# Patient Record
Sex: MALE | Race: WHITE
[De-identification: names, ages, dates, MRNs, and addresses within clinical notes are randomized per-mention and may not be internally consistent; named-entity substitution may affect disease eponyms.]

---

## 2021-03-11 ENCOUNTER — HOSPITAL ENCOUNTER (OUTPATIENT)
Dept: HOSPITAL 47 - RADUSWWP | Age: 71
Discharge: HOME | End: 2021-03-11
Attending: OPHTHALMOLOGY
Payer: MEDICARE

## 2021-03-11 DIAGNOSIS — G45.3: Primary | ICD-10-CM

## 2021-03-11 PROCEDURE — 93880 EXTRACRANIAL BILAT STUDY: CPT

## 2021-03-11 NOTE — US
EXAMINATION TYPE: US carotid duplex BILAT

 

DATE OF EXAM: 3/11/2021

 

COMPARISON: NONE

 

CLINICAL HISTORY: G45.3 AMAUNSIS FUGA. Pt states transient loss of vision with left eye

 

EXAM MEASUREMENTS: 

 

RIGHT:  Peak Systolic Velocity (PSV) cm/sec

----- Right CCA:  98.5  

----- Right ICA:  87.7     

----- Right ECA:  106   

ICA/CCA ratio:  0.9    

 

RIGHT:  End Diastole cm/sec

----- Right CCA:  16.3   

----- Right ICA:  24.5      

----- Right ECA:  11.6     

 

LEFT:  Peak Systolic Velocity (PSV) cm/sec

----- Left CCA:  85.8  

----- Left ICA:  98.4   

----- Left ECA:  109  

ICA/CCA ratio:  0.9  

 

LEFT:  End Diastole cm/sec

----- Left CCA:  17.4  

----- Left ICA:  24.6   

----- Left ECA:  15.0 

 

VERTEBRALS (direction of flow):

Right Vertebral: Antegrade

Left Vertebral: Antegrade

 

Rhythm:  Normal

 

No significant stenosis seen

 

 

 

IMPRESSION:  

1. No significant hemodynamic stenosis as visualized.   

 

 

Criteria for Assigning % of Stenosis / Diameter reduction

(Estimation based on the indirect measurements of the internal carotid artery velocities (ICA PSV).

1.  Normal (no stenosis)=ICA PSV < 125 cm/s: ratio < 2.0: ICA EDV<40 cm/s.

2. Less than 50% stenosis=ICA PSV < 125 cm/s: ratio < 2.0: ICA EDV<40 cm/s.

3.  50 to 69% stenosis=ICA PSV of 125 to 230 cm/s: ration 2.0 ? 4.0: ICA EDV  cm/s.

4.  Greater than 70% stenosis to near occlusion= ICA PSV > 230 cm/s: ratio > 4.0: ICA EDV > 100 cm/s.
 

5.  Near occlusion= ICA PSV velocities may be low or undetectable: variable ratio and ICA EDV.

6.  Total occlusion=unable to detect flow.

## 2021-04-05 ENCOUNTER — HOSPITAL ENCOUNTER (OUTPATIENT)
Dept: HOSPITAL 47 - RADCTMAIN | Age: 71
Discharge: HOME | End: 2021-04-05
Attending: FAMILY MEDICINE
Payer: MEDICARE

## 2021-04-05 DIAGNOSIS — I67.82: Primary | ICD-10-CM

## 2021-04-05 LAB — BUN SERPL-SCNC: 25 MG/DL (ref 9–20)

## 2021-04-05 PROCEDURE — 82565 ASSAY OF CREATININE: CPT

## 2021-04-05 PROCEDURE — 84520 ASSAY OF UREA NITROGEN: CPT

## 2021-04-05 PROCEDURE — 70470 CT HEAD/BRAIN W/O & W/DYE: CPT

## 2021-04-05 PROCEDURE — 36415 COLL VENOUS BLD VENIPUNCTURE: CPT

## 2021-04-05 NOTE — CT
EXAMINATION TYPE: CT brain wo/w con

 

DATE OF EXAM: 4/5/2021

 

COMPARISON: None

 

HISTORY: Syncope.

 

CT DLP: 2122.4 mGycm

 

Unenhanced CT of the brain was performed.  

 

The ventricles, basal cisterns and sulci overlying the cerebral convexities demonstrate mild enlargem
ent. 

 

There is no evidence for intracranial hemorrhage or sulcal effacement.  

 

There is decreased attenuation about the periventricular white matter and deep white matter of both c
erebral hemispheres, compatible with chronic small vessel ischemia. Differential diagnosis does inclu
de demyelination. 

 

No mass effects are seen.No midline shift.

 

Osseous calvarium is intact.  

 

If symptoms persist consider MRI.  

 

IMPRESSION:

 

1. Age related atrophic and chronic small vessel ischemic change without acute intracranial process s
een at this time.

## 2022-11-04 ENCOUNTER — HOSPITAL ENCOUNTER (OUTPATIENT)
Dept: HOSPITAL 47 - RADXRMAIN | Age: 72
Discharge: HOME | End: 2022-11-04
Attending: FAMILY MEDICINE
Payer: MEDICARE

## 2022-11-04 DIAGNOSIS — M17.12: Primary | ICD-10-CM

## 2022-11-04 NOTE — XR
EXAMINATION TYPE: XR knee complete bilateral

 

DATE OF EXAM: 11/4/2022 11:38 AM

 

INDICATION: 

Patient age:Male;  72 years old; 

Reason for study: R KNEE DERANGEMENT L KNEE AO/DJD; 

 

COMPARISON: None.

 

TECHNIQUE: The Bilateral knee(s) was examined in 3 projections. Frontal, lateral and oblique.

 

FINDINGS:   No evidence of any acute osseous pathology, soft tissue swelling, or joint effusion is no
bj.

Bilateral osteophyte formation and tibial plateau and patella is as well as the condyles. Right fabel
la noted. There is atherosclerosis of the arterial vasculature. There is joint space narrowing most p
ronounced in the medial joint spaces bilaterally.

 

IMPRESSION: 

1. No acute osseous pathology.

2. Moderate tricompartmental osteoarthritic changes.

## 2024-01-09 ENCOUNTER — HOSPITAL ENCOUNTER (OUTPATIENT)
Dept: HOSPITAL 47 - RADXRMAIN | Age: 74
Discharge: HOME | End: 2024-01-09
Attending: FAMILY MEDICINE
Payer: MEDICARE

## 2024-01-09 DIAGNOSIS — R06.02: Primary | ICD-10-CM

## 2024-01-09 PROCEDURE — 71046 X-RAY EXAM CHEST 2 VIEWS: CPT

## 2024-01-09 NOTE — XR
EXAMINATION TYPE: XR chest 2V

 

DATE OF EXAM: 1/9/2024

 

COMPARISON: 11/25/2014

 

INDICATION: Short of breath

 

TECHNIQUE:  Frontal and lateral views of the chest are obtained.

 

FINDINGS:  

The heart size is normal.  

The pulmonary vasculature is normal.

The lungs are clear.

 

IMPRESSION:  

1. No acute pulmonary process.

## 2024-04-25 ENCOUNTER — HOSPITAL ENCOUNTER (INPATIENT)
Dept: HOSPITAL 47 - EC | Age: 74
LOS: 4 days | Discharge: HOME | DRG: 280 | End: 2024-04-29
Attending: HOSPITALIST | Admitting: HOSPITALIST
Payer: MEDICARE

## 2024-04-25 DIAGNOSIS — Z79.899: ICD-10-CM

## 2024-04-25 DIAGNOSIS — I51.3: ICD-10-CM

## 2024-04-25 DIAGNOSIS — G47.9: ICD-10-CM

## 2024-04-25 DIAGNOSIS — I25.5: ICD-10-CM

## 2024-04-25 DIAGNOSIS — M19.90: ICD-10-CM

## 2024-04-25 DIAGNOSIS — I25.10: ICD-10-CM

## 2024-04-25 DIAGNOSIS — I11.0: Primary | ICD-10-CM

## 2024-04-25 DIAGNOSIS — J44.89: ICD-10-CM

## 2024-04-25 DIAGNOSIS — I25.2: ICD-10-CM

## 2024-04-25 DIAGNOSIS — I50.21: ICD-10-CM

## 2024-04-25 DIAGNOSIS — Z85.528: ICD-10-CM

## 2024-04-25 DIAGNOSIS — E78.5: ICD-10-CM

## 2024-04-25 DIAGNOSIS — I49.3: ICD-10-CM

## 2024-04-25 DIAGNOSIS — I21.4: ICD-10-CM

## 2024-04-25 DIAGNOSIS — Z87.891: ICD-10-CM

## 2024-04-25 DIAGNOSIS — I25.84: ICD-10-CM

## 2024-04-25 DIAGNOSIS — I34.0: ICD-10-CM

## 2024-04-25 LAB
ALBUMIN SERPL-MCNC: 4.2 G/DL (ref 3.5–5)
ALP SERPL-CCNC: 62 U/L (ref 38–126)
ALT SERPL-CCNC: 38 U/L (ref 4–49)
ANION GAP SERPL CALC-SCNC: 8 MMOL/L
AST SERPL-CCNC: 38 U/L (ref 17–59)
BASOPHILS # BLD AUTO: 0.1 K/UL (ref 0–0.2)
BASOPHILS NFR BLD AUTO: 1 %
BUN SERPL-SCNC: 20 MG/DL (ref 9–20)
CALCIUM SPEC-MCNC: 10 MG/DL (ref 8.4–10.2)
CHLORIDE SERPL-SCNC: 108 MMOL/L (ref 98–107)
CO2 SERPL-SCNC: 22 MMOL/L (ref 22–30)
EOSINOPHIL # BLD AUTO: 0.3 K/UL (ref 0–0.7)
EOSINOPHIL NFR BLD AUTO: 4 %
ERYTHROCYTE [DISTWIDTH] IN BLOOD BY AUTOMATED COUNT: 5.5 M/UL (ref 4.3–5.9)
ERYTHROCYTE [DISTWIDTH] IN BLOOD: 13.1 % (ref 11.5–15.5)
GLUCOSE SERPL-MCNC: 93 MG/DL (ref 74–99)
HCT VFR BLD AUTO: 51.9 % (ref 39–53)
HGB BLD-MCNC: 16.5 GM/DL (ref 13–17.5)
LYMPHOCYTES # SPEC AUTO: 1.7 K/UL (ref 1–4.8)
LYMPHOCYTES NFR SPEC AUTO: 21 %
MAGNESIUM SPEC-SCNC: 2.2 MG/DL (ref 1.6–2.3)
MCH RBC QN AUTO: 30 PG (ref 25–35)
MCHC RBC AUTO-ENTMCNC: 31.8 G/DL (ref 31–37)
MCV RBC AUTO: 94.4 FL (ref 80–100)
MONOCYTES # BLD AUTO: 0.5 K/UL (ref 0–1)
MONOCYTES NFR BLD AUTO: 6 %
NEUTROPHILS # BLD AUTO: 5.3 K/UL (ref 1.3–7.7)
NEUTROPHILS NFR BLD AUTO: 67 %
NT-PROBNP SERPL-MCNC: (no result) PG/ML
PLATELET # BLD AUTO: 178 K/UL (ref 150–450)
POTASSIUM SERPL-SCNC: 4.6 MMOL/L (ref 3.5–5.1)
PROT SERPL-MCNC: 6.8 G/DL (ref 6.3–8.2)
SODIUM SERPL-SCNC: 138 MMOL/L (ref 137–145)
WBC # BLD AUTO: 7.9 K/UL (ref 3.8–10.6)

## 2024-04-25 PROCEDURE — 96365 THER/PROPH/DIAG IV INF INIT: CPT

## 2024-04-25 PROCEDURE — 93005 ELECTROCARDIOGRAM TRACING: CPT

## 2024-04-25 PROCEDURE — 85025 COMPLETE CBC W/AUTO DIFF WBC: CPT

## 2024-04-25 PROCEDURE — 96366 THER/PROPH/DIAG IV INF ADDON: CPT

## 2024-04-25 PROCEDURE — 85027 COMPLETE CBC AUTOMATED: CPT

## 2024-04-25 PROCEDURE — 36415 COLL VENOUS BLD VENIPUNCTURE: CPT

## 2024-04-25 PROCEDURE — 96375 TX/PRO/DX INJ NEW DRUG ADDON: CPT

## 2024-04-25 PROCEDURE — 80053 COMPREHEN METABOLIC PANEL: CPT

## 2024-04-25 PROCEDURE — 93458 L HRT ARTERY/VENTRICLE ANGIO: CPT

## 2024-04-25 PROCEDURE — 87636 SARSCOV2 & INF A&B AMP PRB: CPT

## 2024-04-25 PROCEDURE — 71250 CT THORAX DX C-: CPT

## 2024-04-25 PROCEDURE — 83735 ASSAY OF MAGNESIUM: CPT

## 2024-04-25 PROCEDURE — 83880 ASSAY OF NATRIURETIC PEPTIDE: CPT

## 2024-04-25 PROCEDURE — 93306 TTE W/DOPPLER COMPLETE: CPT

## 2024-04-25 PROCEDURE — 84484 ASSAY OF TROPONIN QUANT: CPT

## 2024-04-25 PROCEDURE — 71046 X-RAY EXAM CHEST 2 VIEWS: CPT

## 2024-04-25 PROCEDURE — 80048 BASIC METABOLIC PNL TOTAL CA: CPT

## 2024-04-25 PROCEDURE — 85610 PROTHROMBIN TIME: CPT

## 2024-04-25 PROCEDURE — 99291 CRITICAL CARE FIRST HOUR: CPT

## 2024-04-25 PROCEDURE — 87070 CULTURE OTHR SPECIMN AEROBIC: CPT

## 2024-04-25 PROCEDURE — 85730 THROMBOPLASTIN TIME PARTIAL: CPT

## 2024-04-25 RX ADMIN — FUROSEMIDE STA MG: 10 INJECTION, SOLUTION INTRAMUSCULAR; INTRAVENOUS at 20:25

## 2024-04-25 RX ADMIN — HEPARIN SODIUM SCH MLS/HR: 10000 INJECTION, SOLUTION INTRAVENOUS at 20:30

## 2024-04-25 RX ADMIN — HEPARIN SODIUM ONE UNIT: 1000 INJECTION, SOLUTION INTRAVENOUS; SUBCUTANEOUS at 20:28

## 2024-04-25 RX ADMIN — ASPIRIN 81 MG CHEWABLE TABLET STA MG: 81 TABLET CHEWABLE at 20:24

## 2024-04-25 NOTE — XR
EXAMINATION TYPE: XR chest 2V

 

DATE OF EXAM: 4/25/2024

 

COMPARISON: 1/9/2024

 

HISTORY: Shortness of breath

 

TECHNIQUE:  Frontal and lateral views of the chest are obtained.

 

FINDINGS:  

 

There is moderate cardiomegaly and small bilateral pleural effusions. The pulmonary vasculature is no
t congested. There is no interstitial or airspace opacities suggest pulmonary edema.

 

There are multiple healed left rib fractures otherwise the osseous structures are intact

 

IMPRESSION:  Moderate cardiomegaly with small bilateral pleural effusions suggesting mild CHF.

## 2024-04-25 NOTE — ED
SOB HPI





- General


Source: patient, RN notes reviewed


Mode of arrival: ambulatory


Limitations: no limitations





<Jaqueline Chairez - Last Filed: 04/25/24 17:16>





- General


Source: patient, RN notes reviewed, old records reviewed





<Willy Nina - Last Filed: 04/25/24 20:49>





- General


Chief Complaint: Shortness of Breath


Stated Complaint: HAYDE/SOB


Time Seen by Provider: 04/25/24 16:50





- History of Present Illness


Initial Comments: 


Gus notewinstons is a 74-year-old male presents emergency department chief 

complaint of dyspnea.  Patient is also endorsing symptoms of orthopnea over the 

past few weeks to month.  Patient states that he has been experiencing dyspnea 

since the fall due to hydrogen sulfide poisoning at a landfill.  Patient has 

history of COPD states he has history of asthma as a child.  Denies fevers, 

history of DVT or PE, lower extremity swelling, and is not on any blood 

thinners. 


 (Jaqueline Chairez)


Patient is a 74-year-old male who presents emergency department complaining of 

dyspnea.  States for the last 2 weeks he has been having worsening shortness of 

breath, orthopnea, difficulty sleeping at night.  Also endorsing worsening lower

extremity edema.  No cough.  No chest pain.  No abdominal pain, nausea, vomi

ting.  Presents emergency department for further evaluation at this time.  

Denies any follow-up with cardiology.  Has a history of hypertension.  Presents 

for further evaluation at this time.  Denies any chest pain. (Willy Nina)





- Related Data


                                Home Medications











 Medication  Instructions  Recorded  Confirmed


 


Cholecalciferol [Vitamin D3 (25 25 mcg PO DAILY 04/25/24 04/25/24





Mcg = 1000 Iu)]   


 


Simvastatin [Zocor] 20 mg PO DAILY 04/25/24 04/25/24


 


Vit C/E/Zn/Coppr/Lutein/Zeaxan 1 cap PO DAILY 04/25/24 04/25/24





[Preservision Areds 2 Softgel]   


 


amLODIPine [Norvasc] 10 mg PO DAILY 04/25/24 04/25/24


 


lisinopriL [Zestril] 20 mg PO DAILY 04/25/24 04/25/24











                                    Allergies











Allergy/AdvReac Type Severity Reaction Status Date / Time


 


No Known Allergies Allergy   Verified 04/25/24 19:53














Review of Systems


ROS Other: All systems not noted in ROS Statement are negative.





<Jaqueline Chairez - Last Filed: 04/25/24 17:16>


ROS Other: All systems not noted in ROS Statement are negative.





<Willy Nina - Last Filed: 04/25/24 20:49>


ROS Statement: 


Those systems with pertinent positive or pertinent negative responses have been 

documented in the HPI.





Review of Systems:


CONST: Denies fever


EYES: Denies blurry vision


ENT: Denies nasal congestion


C/V: Denies Chest pain


RESP: Endorses shortness of breath


GI: Denies abdominal pain


: Denies dysuria


SKIN: Denies rash.


MSK: Denies joint pain.


NEURO: Denies headache


 (Willy Nina)





Past Medical History


Past Medical History: Hyperlipidemia, Hypertension


History of Any Multi-Drug Resistant Organisms: None Reported


Additional Past Surgical History / Comment(s): rt kidney removal


Past Psychological History: No Psychological Hx Reported


Smoking Status: Never smoker


Past Alcohol Use History: Occasional


Past Drug Use History: None Reported





<Jaqueline Chairez - Last Filed: 04/25/24 17:16>





General Exam


Limitations: no limitations





<Jaqueline Chairez - Last Filed: 04/25/24 17:16>





<Willy Nina - Last Filed: 04/25/24 20:49>





- General Exam Comments


Initial Comments: 


Visual Physical Exam


 


Vital signs reviewed


 


General: Well-appearing, nontoxic, no acute distress.


Head: Normocephalic, atraumatic


Eyes: PERRLA, EOMI


ENT: Airway patent


Chest: Nonlabored breathing


Skin: No visual rash, normal skin tone


Neuro: Alert and oriented 3


Musculoskeletal: No gross abnormalities





 (Jaqueline Chairez)


General: Appears in no acute distress.


HEAD: Normal with no signs of head trauma.


EYES: PERRLA, EOMI, conjunctiva normal, no discharge.


ENT: Hearing grossly intact, normal oropharynx.


RESPIRATORY: Clear breath sounds bilaterally.  No wheezes, rales, or rhonchi.  

No hypoxia.


C/V: Regular rate and rhythm. S1 and S2 auscultated, metrical lower extremity 

pitting edema, peripheral pulses 2+ and intact throughout


ABD: Abd is soft, nontender, nondistended


EXT: Normal range of motion, no obvious deformity


SKIN: No rashes or lesions observed on exposed skin.


NEURO: Alert and oriented x 4.


 (Willy Nina)





Course


                                   Vital Signs











  04/25/24 04/25/24 04/25/24





  16:42 18:34 19:56


 


Temperature 97.9 F  


 


Pulse Rate 75 87 80


 


Respiratory 16 18 18





Rate   


 


Blood Pressure 179/117 181/120 169/120


 


O2 Sat by Pulse 99 98 97





Oximetry   














Medical Decision Making





<Jaqueline Chairez - Last Filed: 04/25/24 17:16>





- Lab Data


Result diagrams: 


                                 04/25/24 18:14





                                 04/25/24 19:20





- EKG Data


-: EKG Interpreted by Me





<MaicoWilly - Last Filed: 04/25/24 20:49>





- Medical Decision Making


I completed the quick note portion of this chart signed Jaqueline Chairez PA-C


 (Jaqueline Chairez)


Was pt. sent in by a medical professional or institution ([facility], NP, urgent 

care, hospital, or nursing home...) When possible be specific


@  -No


Did you speak to anyone other than the patient for history (EMS, parent, family,

 police, friend...)? What history was obtained from this source 


@  -No


Did you review nursing and triage notes (agree or disagree)?  Why? 


@  -I reviewed and agree with nursing and triage notes


Were old charts reviewed (outside hosp., previous admission, EMS record, old 

EKG, old radiological studies, urgent care reports/EKG's, nursing home records)?

 Report findings 


@  -Old charts reviewed


Differential Diagnosis (chest pain, altered mental status, abdominal pain women,

 abdominal pain men, vaginal bleeding, weakness, fever, dyspnea, syncope, 

headache, dizziness, GI bleed, back pain, seizure, CVA, palpatations, mental 

health, musculoskeletal)? 


@  -Differential Dyspnea:


Coronary syndrome, arrhythmia, tamponade, asthma, COPD, pulmonary embolism, 

pneumonia, pneumothorax, pulmonary effusion, anaphylaxis, diabetic ketoacidosis,

 flailed chest, pulmonary contusion, diaphragmatic rupture, anemia, 

neuromuscular, this is not meant to be an all-inclusive list. 





EKG interpreted by me (3pts min.).


@  -As above


X-rays interpreted by me (1pt min.).


@  -Chest x-ray shows findings consistent with CHF


CT interpreted by me (1pt min.).


@  -None done


U/S interpreted by me (1pt. min.).


@  -None done


What testing was considered but not performed or refused? (CT, X-rays, U/S, 

labs)? Why?


@  -None


What meds were considered but not given or refused? Why?


@  -None


Did you discuss the management of the patient with other professionals 

(professionals i.e. , PA, NP, lab, RT, psych nurse, , , 

teacher, , )? Give summary


@  -Discussed with admitting provider, SHAHNAZ Latif of [facility] who accepted the 

admission.


Was smoking cessation discussed for >3mins.?


@  -No


Was critical care preformed (if so, how long)?


@  -Yes, 35 minutes


Were there social determinants of health that impacted care today? How? 

(Homelessness, low income, unemployed, alcoholism, drug addiction, transporta

tion, low edu. Level, literacy, decrease access to med. care, skilled nursing, rehab)?


@  -No


Was there de-escalation of care discussed even if they declined (Discuss DNR or 

withdrawal of care, Hospice)? DNR status


@  -No


What co-morbidities impacted this encounter? (DM, HTN, Smoking, COPD, CAD, 

Cancer, CVA, ARF, Chemo, Hep., AIDS, mental health diagnosis, sleep apnea, 

morbid obesity)?


@  -Hypertension


Was patient admitted / discharged? Hospital course, mention meds given and 

route, prescriptions, significant lab abnormalities, going to OR and other 

pertinent info.


@  -Based on the patient's presentation and physical exam, presents with 

dyspnea.  Suspect CHF exacerbation for the patient.  Patient originally worked 

up as a quick note.  We will obtain cardiopulmonary workup.  Patient in 

agreement this plan.  Vital signs currently within acceptable limits.





EKG shows no signs of acute ischemia.  Laboratory studies remarkable for 

elevated troponin of 0.823 as well as elevated BNP of 13,000.  Chest x-ray does 

show findings consistent with CHF.





I updated the patient.  He is chest pain-free.  We discussed his workup.  

Patient be started on a heparin drip for NSTEMI as well as IV Lasix for CHF.  

Patient was in agreement this plan.  Echo ordered.  Cardiology consulted.





I spoke with the admitting team, SHAHNAZ Latif of [facility] who accepted the admission.








Undiagnosed new problem with uncertain prognosis?


@  -No


Drug Therapy requiring intensive monitoring for toxicity (Heparin, Nitro, 

Insulin, Cardizem)?


@  -Heparin


Were any procedures done?


@  -No


Diagnosis/symptom?


@  -NSTEMI, CHF


Acute, or Chronic, or Acute on Chronic?


@  -Acute


Uncomplicated (without systemic symptoms) or Complicated (systemic symptoms)?


@  -Complicated


Side effects of treatment?


@  -No


Exacerbation, Progression, or Severe Exacerbation?


@  -No


Poses a threat to life or bodily function? How? (Chest pain, USA, MI, pneumonia,

 PE, COPD, DKA, ARF, appy, cholecystitis, CVA, Diverticulitis, Homicidal, 

Suicidal, threat to staff... and all critical care pts)


@  -Yes


 (Willy Nina)





- Lab Data


                                   Lab Results











  04/25/24 04/25/24 04/25/24 Range/Units





  18:14 18:57 19:20 


 


WBC  7.9    (3.8-10.6)  k/uL


 


RBC  5.50    (4.30-5.90)  m/uL


 


Hgb  16.5    (13.0-17.5)  gm/dL


 


Hct  51.9    (39.0-53.0)  %


 


MCV  94.4    (80.0-100.0)  fL


 


MCH  30.0    (25.0-35.0)  pg


 


MCHC  31.8    (31.0-37.0)  g/dL


 


RDW  13.1    (11.5-15.5)  %


 


Plt Count  178    (150-450)  k/uL


 


MPV  8.0    


 


Neutrophils %  67    %


 


Lymphocytes %  21    %


 


Monocytes %  6    %


 


Eosinophils %  4    %


 


Basophils %  1    %


 


Neutrophils #  5.3    (1.3-7.7)  k/uL


 


Lymphocytes #  1.7    (1.0-4.8)  k/uL


 


Monocytes #  0.5    (0-1.0)  k/uL


 


Eosinophils #  0.3    (0-0.7)  k/uL


 


Basophils #  0.1    (0-0.2)  k/uL


 


Sodium    138  (137-145)  mmol/L


 


Potassium    4.6  (3.5-5.1)  mmol/L


 


Chloride    108 H  ()  mmol/L


 


Carbon Dioxide    22  (22-30)  mmol/L


 


Anion Gap    8  mmol/L


 


BUN    20  (9-20)  mg/dL


 


Creatinine    1.12  (0.66-1.25)  mg/dL


 


Est GFR (CKD-EPI)AfAm    76  (>60 ml/min/1.73 sqM)  


 


Est GFR (CKD-EPI)NonAf    66  (>60 ml/min/1.73 sqM)  


 


Glucose    93  (74-99)  mg/dL


 


Calcium    10.0  (8.4-10.2)  mg/dL


 


Magnesium    2.2  (1.6-2.3)  mg/dL


 


Total Bilirubin    2.0 H  (0.2-1.3)  mg/dL


 


AST    38  (17-59)  U/L


 


ALT    38  (4-49)  U/L


 


Alkaline Phosphatase    62  ()  U/L


 


Troponin I     (0.000-0.034)  ng/mL


 


NT-Pro-B Natriuret Pep    65962  pg/mL


 


Total Protein    6.8  (6.3-8.2)  g/dL


 


Albumin    4.2  (3.5-5.0)  g/dL


 


Influenza Type A (PCR)   Not Detected   (Not Detectd)  


 


Influenza Type B (PCR)   Not Detected   (Not Detectd)  


 


RSV (PCR)   Not Detected   (Not Detectd)  


 


SARS-CoV-2 (PCR)   Not Detected   (Not Detectd)  














  04/25/24 Range/Units





  19:20 


 


WBC   (3.8-10.6)  k/uL


 


RBC   (4.30-5.90)  m/uL


 


Hgb   (13.0-17.5)  gm/dL


 


Hct   (39.0-53.0)  %


 


MCV   (80.0-100.0)  fL


 


MCH   (25.0-35.0)  pg


 


MCHC   (31.0-37.0)  g/dL


 


RDW   (11.5-15.5)  %


 


Plt Count   (150-450)  k/uL


 


MPV   


 


Neutrophils %   %


 


Lymphocytes %   %


 


Monocytes %   %


 


Eosinophils %   %


 


Basophils %   %


 


Neutrophils #   (1.3-7.7)  k/uL


 


Lymphocytes #   (1.0-4.8)  k/uL


 


Monocytes #   (0-1.0)  k/uL


 


Eosinophils #   (0-0.7)  k/uL


 


Basophils #   (0-0.2)  k/uL


 


Sodium   (137-145)  mmol/L


 


Potassium   (3.5-5.1)  mmol/L


 


Chloride   ()  mmol/L


 


Carbon Dioxide   (22-30)  mmol/L


 


Anion Gap   mmol/L


 


BUN   (9-20)  mg/dL


 


Creatinine   (0.66-1.25)  mg/dL


 


Est GFR (CKD-EPI)AfAm   (>60 ml/min/1.73 sqM)  


 


Est GFR (CKD-EPI)NonAf   (>60 ml/min/1.73 sqM)  


 


Glucose   (74-99)  mg/dL


 


Calcium   (8.4-10.2)  mg/dL


 


Magnesium   (1.6-2.3)  mg/dL


 


Total Bilirubin   (0.2-1.3)  mg/dL


 


AST   (17-59)  U/L


 


ALT   (4-49)  U/L


 


Alkaline Phosphatase   ()  U/L


 


Troponin I  0.823 H*  (0.000-0.034)  ng/mL


 


NT-Pro-B Natriuret Pep   pg/mL


 


Total Protein   (6.3-8.2)  g/dL


 


Albumin   (3.5-5.0)  g/dL


 


Influenza Type A (PCR)   (Not Detectd)  


 


Influenza Type B (PCR)   (Not Detectd)  


 


RSV (PCR)   (Not Detectd)  


 


SARS-CoV-2 (PCR)   (Not Detectd)  














- EKG Data


EKG Comments: 





12-lead Electrocardiogram Interpretation Note





EKG was reviewed and interpreted by myself. 12-lead ECG performed at 1819 is 

interpreted by me as revealing normal sinus rhythm at a rate of 86 beats per 

minute.  Axis is normal.  MS interval is 147 ms, QRS duration is 88 ms, QTc is 

419 ms..  There were no ST or T wave abnormalities to suggest myocardial 

ischemia or injury.  R wave progression across the precordium was satisfactory. 

 By my interpretation this EKG is non-diagnostic for acute ischemia.


 (Willy Nina)





Critical Care Time


Critical Care Time: Yes


Total Critical Care Time: 35





<Willy Nina - Last Filed: 04/25/24 20:49>





Disposition





<Jaqueline Chairez - Last Filed: 04/25/24 17:16>


Time of Disposition: 20:28





<Willy Nina - Last Filed: 04/25/24 20:49>


Clinical Impression: 


 Congestive heart failure, NSTEMI (non-ST elevated myocardial infarction)





Disposition: ADMITTED AS IP TO THIS HOSP


Condition: Serious


Referrals: 


Oswaldo Saldaña DO [Primary Care Provider] - 1-2 days

## 2024-04-26 LAB
ALBUMIN SERPL-MCNC: 3.7 G/DL (ref 3.5–5)
ALP SERPL-CCNC: 57 U/L (ref 38–126)
ALT SERPL-CCNC: 34 U/L (ref 4–49)
ANION GAP SERPL CALC-SCNC: 5 MMOL/L
APTT BLD: 33.9 SEC (ref 22–30)
AST SERPL-CCNC: 33 U/L (ref 17–59)
BASOPHILS # BLD AUTO: 0.1 K/UL (ref 0–0.2)
BASOPHILS NFR BLD AUTO: 1 %
BUN SERPL-SCNC: 23 MG/DL (ref 9–20)
CALCIUM SPEC-MCNC: 9.5 MG/DL (ref 8.4–10.2)
CHLORIDE SERPL-SCNC: 107 MMOL/L (ref 98–107)
CO2 SERPL-SCNC: 26 MMOL/L (ref 22–30)
EOSINOPHIL # BLD AUTO: 0.3 K/UL (ref 0–0.7)
EOSINOPHIL NFR BLD AUTO: 5 %
ERYTHROCYTE [DISTWIDTH] IN BLOOD BY AUTOMATED COUNT: 4.98 M/UL (ref 4.3–5.9)
ERYTHROCYTE [DISTWIDTH] IN BLOOD: 13.1 % (ref 11.5–15.5)
GLUCOSE SERPL-MCNC: 88 MG/DL (ref 74–99)
HCT VFR BLD AUTO: 47.1 % (ref 39–53)
HGB BLD-MCNC: 15.1 GM/DL (ref 13–17.5)
INR PPP: 1 (ref ?–1.2)
LYMPHOCYTES # SPEC AUTO: 2 K/UL (ref 1–4.8)
LYMPHOCYTES NFR SPEC AUTO: 28 %
MCH RBC QN AUTO: 30.3 PG (ref 25–35)
MCHC RBC AUTO-ENTMCNC: 32 G/DL (ref 31–37)
MCV RBC AUTO: 94.6 FL (ref 80–100)
MONOCYTES # BLD AUTO: 0.6 K/UL (ref 0–1)
MONOCYTES NFR BLD AUTO: 9 %
NEUTROPHILS # BLD AUTO: 3.9 K/UL (ref 1.3–7.7)
NEUTROPHILS NFR BLD AUTO: 56 %
PLATELET # BLD AUTO: 175 K/UL (ref 150–450)
POTASSIUM SERPL-SCNC: 3.8 MMOL/L (ref 3.5–5.1)
PROT SERPL-MCNC: 6.2 G/DL (ref 6.3–8.2)
PT BLD: 11.2 SEC (ref 10–12.5)
SODIUM SERPL-SCNC: 138 MMOL/L (ref 137–145)
WBC # BLD AUTO: 7 K/UL (ref 3.8–10.6)

## 2024-04-26 PROCEDURE — B2111ZZ FLUOROSCOPY OF MULTIPLE CORONARY ARTERIES USING LOW OSMOLAR CONTRAST: ICD-10-PCS

## 2024-04-26 PROCEDURE — 4A023N7 MEASUREMENT OF CARDIAC SAMPLING AND PRESSURE, LEFT HEART, PERCUTANEOUS APPROACH: ICD-10-PCS

## 2024-04-26 RX ADMIN — VERAPAMIL HYDROCHLORIDE ONE ML: 2.5 INJECTION, SOLUTION INTRAVENOUS at 10:57

## 2024-04-26 RX ADMIN — FUROSEMIDE SCH MG: 10 INJECTION, SOLUTION INTRAMUSCULAR; INTRAVENOUS at 09:16

## 2024-04-26 RX ADMIN — LIDOCAINE HYDROCHLORIDE ONE ML: 10 INJECTION, SOLUTION INFILTRATION; PERINEURAL at 10:55

## 2024-04-26 RX ADMIN — HEPARIN SODIUM ONE UNIT: 1000 INJECTION, SOLUTION INTRAVENOUS; SUBCUTANEOUS at 11:02

## 2024-04-26 RX ADMIN — IOPAMIDOL ONE ML: 755 INJECTION, SOLUTION INTRAVENOUS at 11:13

## 2024-04-26 RX ADMIN — MIDAZOLAM ONE MG: 1 INJECTION INTRAMUSCULAR; INTRAVENOUS at 10:53

## 2024-04-26 RX ADMIN — ASPIRIN 325 MG ORAL TABLET STA MG: 325 PILL ORAL at 09:16

## 2024-04-26 RX ADMIN — ATORVASTATIN CALCIUM SCH MG: 10 TABLET, FILM COATED ORAL at 09:16

## 2024-04-26 RX ADMIN — ATORVASTATIN CALCIUM STA MG: 80 TABLET, FILM COATED ORAL at 09:16

## 2024-04-26 RX ADMIN — FENTANYL CITRATE ONE MCG: 50 INJECTION, SOLUTION INTRAMUSCULAR; INTRAVENOUS at 10:53

## 2024-04-26 RX ADMIN — HEPARIN SODIUM PRN UNIT: 1000 INJECTION, SOLUTION INTRAVENOUS; SUBCUTANEOUS at 05:00

## 2024-04-26 RX ADMIN — MUPIROCIN SCH: 20 OINTMENT TOPICAL at 20:39

## 2024-04-26 RX ADMIN — ATORVASTATIN CALCIUM SCH MG: 80 TABLET, FILM COATED ORAL at 20:02

## 2024-04-26 RX ADMIN — CEFAZOLIN SCH: 330 INJECTION, POWDER, FOR SOLUTION INTRAMUSCULAR; INTRAVENOUS at 16:07

## 2024-04-26 RX ADMIN — CEFAZOLIN ONE MLS: 330 INJECTION, POWDER, FOR SOLUTION INTRAMUSCULAR; INTRAVENOUS at 10:20

## 2024-04-26 NOTE — CONS
CONSULTATION



CHIEF COMPLAINT:

Shortness of breath.



HISTORY OF PRESENT ILLNESS:

Milton is a 74-year-old gentleman with history of hypertension, dyslipidemia, who

presented to hospital with symptoms suggestive of new-onset congestive heart failure.

He states that over the last 2 weeks he has been developing progressively worsening

shortness of breath, primarily at nighttime.  The patient has history of hypertension,

dyslipidemia, but there is no prior history of coronary artery disease or congestive

heart failure.  He had bilateral lower extremity edema also.  Chest x-ray revealed

evidence of pulmonary congestion and his BNP is elevated, there is moderate

cardiomegaly.  IV Lasix had improved his symptoms.  At the time of my evaluation, he is

free of chest pain, difficulty in breathing.  EKG shows sinus rhythm, evidence of prior

anteroseptal myocardial infarction, PVCs, and nonspecific ST-T wave changes.  Three

sets of troponins are elevated at 0.8, 0.8, and 0.8 with a BNP of 15,400.  Creatinine

is normal, hemoglobin is normal.  The patient's clinical presentation is consistent

with acute onset congestive heart failure.  I am going to obtain a 2D echo to evaluate

his LV function and also a non-ST-segment elevation MI.  I will consider performing

cardiac catheterization on him for further evaluation.  I talked to him at length about

these.  He understands and in agreement with the plans and I am waiting on the

echocardiogram at this time.



PAST MEDICAL HISTORY:

Significant for hypertension, dyslipidemia.



MEDICATIONS:

1. Norvasc 10 daily.

2. Zestril 20 daily.

3. Zocor 20 daily.



ALLERGIES:

No known drug allergies.



FAMILY HISTORY:

Negative for premature coronary artery disease.



SOCIAL HISTORY:

Negative for smoking, EtOH abuse, or drug abuse.



REVIEW OF SYSTEMS:

A 14/14 review of systems has been performed, pertinents are as documented.



PHYSICAL EXAMINATION:

VITAL SIGNS:  Heart rate is 77 beats, blood pressure 138/92, respiratory rate is 18,

and O2 saturation is 95% on room air.

NECK:  There is no jugular venous distention.  Carotid upstroke is normal.  There is no

bruit.

CHEST:  Reveals good air entry bilaterally.

HEART:  Reveals first and second heart sounds.  No gallop, no murmur.

ABDOMEN:  Soft, nontender.

EXTREMITIES:  Did not reveal any edema.  Peripheral pulses are felt.



LABS:

As described above.



DIAGNOSTIC STUDIES:

EKG is as described above.  Chest x-ray is as described above.



ASSESSMENT AND PLAN:

1. Acute non-ST-segment elevation MI.

2. Acute-onset congestive heart failure of undetermined LV function.



PLAN:

I will continue with Lasix, aspirin, Zestril, Lipitor, Norvasc, and add a beta blocker.

Obtain a 2D echo and perform cardiac catheterization on him.





TAIWO / JOSE MANUELN: [id number] / Job#: [id number]

## 2024-04-26 NOTE — P.GSCN
History of Present Illness


Consult date: 04/26/24


Reason for Consult: 





Multivessel coronary artery disease, non-ST elevated myocardial infarction this 

admission, left ventricular ejection fraction estimated at 25 to 30%, moderate 

to severe mitral valve regurgitation on transthoracic 2D echocardiogram


Requesting physician: Nelson Golden


History of present illness: 





This is a 74-year-old gentleman who follows on an outpatient basis with Dr. Oswaldo Saldaña for his primary care.  He has a past medical history significant for 

hypertension, hyperlipidemia, childhood asthma, remote history of nicotine 

dependence quit smoking in 1976, rare EtOH use, history of syncope in April 2021

with CT of the brain showing no acute intracranial process, history of cancer of

the kidney, status post right nephrectomy, arthritis of the spine and family 

history of early onset coronary artery disease with his dad passing away at age 

45 from a myocardial infarction and 3 brothers diagnosed with coronary artery 

disease.  He presented to the emergency department here at [facility] yesterday April 25 with complaints of progressive dyspnea and 

orthopnea.  He states his shortness of breath is tolerable when sitting up but 

when laying flat he has trouble catching his breath.  He denies any recent 

fever, chills, nausea, vomiting, chest pain/pressure, palpitations, 

constipation, diarrhea, headache, hemoptysis, hematemesis, presyncope or 

syncope.  A twelve-lead EKG was completed in the emergency department which 

showed normal sinus rhythm with evidence of prior anterior septal myocardial 

infarction, and nonspecific STT wave changes.  Initial laboratory results 

showed positive serial troponins as high as 0.832 and his proBNP was 13,400.  

Due to the patient's symptoms, and serial troponins he was ruled in for an acute

non-ST elevated myocardial infarction and acute congestive heart failure.  A 

transthoracic 2D echocardiogram was completed which showed his left ventricular 

ejection fraction to be estimated at 25 to 30%, mildly increased septal wall 

thickness, mildly increased left ventricular diastolic diameter, global left 

ventricular hypokinesis, apical kinesis and thrombus in the apex.  It also 

demonstrated moderate to severe mitral valve regurgitation, mild aortic valve 

regurgitation, moderate tricuspid valve regurgitation and trace to mild pulmonic

valve regurgitation.  Dr. Golden from cardiology was consulted and the patient 

was recommended to undergo a cardiac catheterization which was completed today 

and demonstrated multivessel coronary artery disease.  Due to the findings on 

the cardiac catheterization a consult was placed to cardiothoracic surgery for 

further evaluation and treatment recommendations including myocardial vasculari

zation surgery.





Review of Systems





A 14 point review of systems was completed and was negative except as mentioned 

in the HPI.





Past Medical History


Past Medical History: Asthma (Childhood asthma), Cancer (Cancer of right 

kidney), Hyperlipidemia, Hypertension, Osteoarthritis (OA)


History of Any Multi-Drug Resistant Organisms: None Reported


Additional Past Surgical History / Comment(s): Right nephrectomy


Past Anesthesia/Blood Transfusion Reactions: No Reported Reaction


Past Psychological History: No Psychological Hx Reported


Smoking Status: Former smoker (Quit smoking in 1976)


Past Alcohol Use History: Occasional


Past Drug Use History: None Reported





- Past Family History


  ** Mother


Family Medical History: Cancer


Additional Family Medical History / Comment(s): Anemia





  ** Father


Family Medical History: Myocardial Infarction (MI) (At age 45)





Medications and Allergies


                                Home Medications











 Medication  Instructions  Recorded  Confirmed  Type


 


Cholecalciferol [Vitamin D3 (25 25 mcg PO DAILY 04/25/24 04/25/24 History





Mcg = 1000 Iu)]    


 


Simvastatin [Zocor] 20 mg PO DAILY 04/25/24 04/25/24 History


 


Vit C/E/Zn/Coppr/Lutein/Zeaxan 1 cap PO DAILY 04/25/24 04/25/24 History





[Preservision Areds 2 Softgel]    


 


amLODIPine [Norvasc] 10 mg PO DAILY 04/25/24 04/25/24 History


 


lisinopriL [Zestril] 20 mg PO DAILY 04/25/24 04/25/24 History








                                    Allergies











Allergy/AdvReac Type Severity Reaction Status Date / Time


 


No Known Allergies Allergy   Verified 04/25/24 19:53














Surgical - Exam


                                   Vital Signs











Temp Pulse Resp BP Pulse Ox


 


 97.9 F   75   16   179/117   99 


 


 04/25/24 16:42  04/25/24 16:42  04/25/24 16:42  04/25/24 16:42  04/25/24 16:42














- General


well developed, well nourished, no distress, no pain





- Eyes


PERRL, normal ocular movement, no pale, no icteric





- ENT


normal pinna, normal nares, normal mucosa, no hearing loss, no congestion





- Neck





Neck is supple, no lymphadenopathy.


no masses, no bruits, trachea midline, no venous distension





- Respiratory





Lungs essentially clear throughout.  Respirations are symmetrical and 

nonlabored.  No wheezes, rhonchi or crackles.





- Cardiovascular





Regular rhythm and rate.  S1 and S2 present, negative for S3, gallop or murmur. 

 +1 edema to his bilateral lower extremities.





- Abdomen





Abdomen is soft, nontender and nondistended.  Active bowel sounds present all 4 

abdominal quadrants.  No guarding or rigidity.  No organomegaly appreciated.





- Genitourinary





Deferred





- Rectum





Deferred





- Integumentary





Skin is warm and dry.  No clubbing or cyanosis is present.


no rash, no growths, no abnormal pigmentation





- Neurologic





No focal deficits. 


normal coordination, normal sensation





- Musculoskeletal





Moves all 4 extremities with equal strength bilateral.


normal gait





- Psychiatric


oriented to time, oriented to person, oriented to place, speech is normal, 

memory intact





Results





- Labs





                                 04/26/24 03:49





                                 04/26/24 03:49


                  Abnormal Lab Results - Last 24 Hours (Table)











  04/25/24 04/25/24 04/25/24 Range/Units





  19:20 19:20 23:19 


 


APTT     (22.0-30.0)  sec


 


Chloride  108 H    ()  mmol/L


 


BUN     (9-20)  mg/dL


 


Total Bilirubin  2.0 H    (0.2-1.3)  mg/dL


 


Troponin I   0.823 H*  0.832 H*  (0.000-0.034)  ng/mL


 


Total Protein     (6.3-8.2)  g/dL














  04/26/24 04/26/24 04/26/24 Range/Units





  03:49 03:49 03:49 


 


APTT    33.9 H  (22.0-30.0)  sec


 


Chloride     ()  mmol/L


 


BUN   23 H   (9-20)  mg/dL


 


Total Bilirubin   1.6 H   (0.2-1.3)  mg/dL


 


Troponin I  0.820 H*    (0.000-0.034)  ng/mL


 


Total Protein   6.2 L   (6.3-8.2)  g/dL








                                 Diabetes panel











  04/25/24 04/26/24 Range/Units





  19:20 03:49 


 


Sodium  138  138  (137-145)  mmol/L


 


Potassium  4.6  3.8  (3.5-5.1)  mmol/L


 


Chloride  108 H  107  ()  mmol/L


 


Carbon Dioxide  22  26  (22-30)  mmol/L


 


BUN  20  23 H  (9-20)  mg/dL


 


Creatinine  1.12  1.16  (0.66-1.25)  mg/dL


 


Glucose  93  88  (74-99)  mg/dL


 


Calcium  10.0  9.5  (8.4-10.2)  mg/dL


 


AST  38  33  (17-59)  U/L


 


ALT  38  34  (4-49)  U/L


 


Alkaline Phosphatase  62  57  ()  U/L


 


Total Protein  6.8  6.2 L  (6.3-8.2)  g/dL


 


Albumin  4.2  3.7  (3.5-5.0)  g/dL








                                  Calcium panel











  04/25/24 04/26/24 Range/Units





  19:20 03:49 


 


Calcium  10.0  9.5  (8.4-10.2)  mg/dL


 


Albumin  4.2  3.7  (3.5-5.0)  g/dL








                                 Pituitary panel











  04/25/24 04/26/24 Range/Units





  19:20 03:49 


 


Sodium  138  138  (137-145)  mmol/L


 


Potassium  4.6  3.8  (3.5-5.1)  mmol/L


 


Chloride  108 H  107  ()  mmol/L


 


Carbon Dioxide  22  26  (22-30)  mmol/L


 


BUN  20  23 H  (9-20)  mg/dL


 


Creatinine  1.12  1.16  (0.66-1.25)  mg/dL


 


Glucose  93  88  (74-99)  mg/dL


 


Calcium  10.0  9.5  (8.4-10.2)  mg/dL








                                  Adrenal panel











  04/25/24 04/26/24 Range/Units





  19:20 03:49 


 


Sodium  138  138  (137-145)  mmol/L


 


Potassium  4.6  3.8  (3.5-5.1)  mmol/L


 


Chloride  108 H  107  ()  mmol/L


 


Carbon Dioxide  22  26  (22-30)  mmol/L


 


BUN  20  23 H  (9-20)  mg/dL


 


Creatinine  1.12  1.16  (0.66-1.25)  mg/dL


 


Glucose  93  88  (74-99)  mg/dL


 


Calcium  10.0  9.5  (8.4-10.2)  mg/dL


 


Total Bilirubin  2.0 H  1.6 H  (0.2-1.3)  mg/dL


 


AST  38  33  (17-59)  U/L


 


ALT  38  34  (4-49)  U/L


 


Alkaline Phosphatase  62  57  ()  U/L


 


Total Protein  6.8  6.2 L  (6.3-8.2)  g/dL


 


Albumin  4.2  3.7  (3.5-5.0)  g/dL














- Imaging


Chest x-ray: report reviewed, image reviewed


EKG: image reviewed


Additional studies: 





Cardiac catheterization films and transthoracic 2D echocardiogram films reviewed

 by Dr. Atkins.





Assessment and Plan


Assessment: 





Multivessel coronary artery disease


Acute non-ST elevated myocardial infarction this admission


Acute systolic congestive heart failure with an ejection fraction of 25 to 30%


Ischemic cardiomyopathy


Hypertension


Hyperlipidemia


History of kidney cancer, status post right nephrectomy


Remote history of nicotine dependence quit smoking in 1976


Childhood asthma


Family history of early onset coronary artery disease with his father passing 

away at age 45 from a myocardial infarction


Osteoarthritis


Plan: 





The patient was seen and examined in the extended stay unit in conjunction with 

Dr. Swati Atkins from cardiothoracic surgery.  His chart and diagnostics were 

reviewed.  Dr. Atkins discussed findings with Dr. Golden from cardiology.  At 

this time the patient is not a surgical candidate due to diffuse left-sided 

coronary artery disease, history of anterior myocardial infarction with clot to 

his apex.  The patient has nonreconstructable coronary artery disease and is not

 a surgical candidate.  Continue to maximize medical management.  Medical 

management other comorbidities per primary care and cardiology service.  Dr. Atkins discussed with the patient and family members present at the patient's 

bedside the findings on the cardiac catheterization and transthoracic 2D 

echocardiogram, and agree with the plan.





Thank you Dr. Golden for this consult.





I have personally seen and examined the patient, performed the documentation and

 the assessment and plan as written.  Number of minutes spent on the visit: 30. 

VAN Casas

## 2024-04-26 NOTE — CC
CARDIAC CATHETERIZATION REPORT



INDICATIONS:

Acute non-ST-segment elevation MI.



PROCEDURE NOTE:

After obtaining informed consent, left heart catheterization and coronary angiogram

were performed via the right radial artery using standard Kay catheters.  The

patient tolerated the procedure well without any obvious immediate complications.  The

patient received moderate conscious sedation.  Total sedation time was 20 minutes.

Right radial artery access was obtained using Seldinger technique, 6-French sheath was

placed.  Catheters and wires were floated into the ascending aorta under fluoroscopic

guidance.  The patient received verapamil and heparin per protocol.



FINDINGS:

1. Hemodynamics:  Left ventricular end-diastolic pressure is 18 mm.  There is no

    significant gradient across the aortic valve.

2. Left Ventriculogram:  Left ventriculogram is not performed.

3. Angiographic Data:

    a.Right Coronary Artery:  Right coronary artery is a large dominant vessel and is

     free of significant disease.  Left main coronary artery appears calcified,

     divides into left anterior descending coronary artery and circumflex coronary

     artery.  LAD shows a focal 70% to 80% stenosis proximally and rest of the LAD

     appears diffusely diseased.  Circumflex coronary artery is a nondominant vessel

     that shows proximal 70% stenosis and very distally there is a focal 95% stenosis

     noted.



CONCLUSION:

Severe two-vessel coronary artery disease as described above.



PLAN:

I am going to review the echo findings and consult a cardiothoracic surgeon to seek

opinion from them.  The patient is a candidate for surgical revascularization.  Dr. Martinez, the on-call interventionist already reviewed the angiographic data and he felt

that if he is not a surgical candidate, we will revascularize the circ and treat the

LAD medically.  If the LAD distribution is viable, will consider catheter based

revascularization of the LAD also.  I discussed these issues at length with the

patient.  He understands and in agreement with the plan.





MMODL / IJN: [id number] / Job#: [id number]

## 2024-04-26 NOTE — CA
Transthoracic Echo Report 

 Name: Milton España 

 MRN:    [id number] 

 Age:    74     Gender:     M 

 

 :    1950 

 Exam Date:     2024 08:47 

 Exam Location: [facility] 

 Ht (in):     68     Wt (lb):     190 

 Ordering Physician:        Willy Nina MD 

 Attending/Referring Phys: 

 Technician         Sabrina Acevedo RDCS 

 Procedure CPT: 

 Indications:       New onset CHF 

 

 Cardiac Hx: 

 Technical Quality:      Poor 

 Contrast 1:                                Total Dose (mL): 

 Contrast 2:                                Total Dose (mL): 

 

 MEASUREMENTS  (Male / Female) Normal Values 

 2D ECHO 

 LV Diastolic Diameter PLAX        6.2 cm                4.2 - 5.9 / 3.9 - 5.3 cm 

 LV Systolic Diameter PLAX         5.4 cm                 

 IVS Diastolic Thickness           1.4 cm                0.6 - 1.0 / 0.6 - 0.9 cm 

 LVPW Diastolic Thickness          1.3 cm                0.6 - 1.0 / 0.6 - 0.9 cm 

 LV Relative Wall Thickness        0.4                    

 RV Internal Dim ED PLAX           3.9 cm                 

 LA Systolic Diameter LX           4.7 cm                3.0 - 4.0 / 2.7 - 3.8 cm 

 LV Diastolic Volume MOD 4C        154.3 cm???              

 LV Systolic Volume MOD 4C         105.8 cm???              

 LV Ejection Fraction MOD 4C       31.4 %                 

 LV Cardiac Index MOD 4C           1981.4 cm???/min???m???      

 LV Diastolic Length 4C            10.4 cm                

 LV Systolic Length 4C             9.7 cm                 

 LV Diastolic Volume MOD 2C        138.6 cm???              

 LV Systolic Volume MOD 2C         108.4 cm???              

 LV Ejection Fraction MOD 2C       21.8 %                 

 LV Cardiac Index MOD 2C           1238.5 cm???/min???m???      

 LV Diastolic Length 2C            9.2 cm                 

 LV Systolic Length 2C             8.4 cm                 

 LA Volume                         99.6 cm???              18 - 58 / 22 - 52 cm??? 

 LA Volume Index                   48.5 cm???/m???           16 - 28 cm???/m??? 

 

 M-MODE 

 Aortic Root Diameter MM           3.8 cm                 

 MV E Point Septal Separation      1.7 cm                 

 AV Cusp Separation MM             2.2 cm                 

 

 DOPPLER 

 AV Peak Velocity                  139.4 cm/s             

 AV Peak Gradient                  7.8 mmHg               

 AI Peak Velocity                  385.6 cm/s             

 AI Peak Gradient                  59.5 mmHg              

 AI Pressure Half Time             1722.8 ms              

 MV Area PHT                       5.2 cm???                

 MR Peak Velocity                  528.0 cm/s             

 MR Peak Gradient                  111.5 mmHg             

 Mitral E Point Velocity           104.1 cm/s             

 Mitral A Point Velocity           52.4 cm/s              

 Mitral E to A Ratio               2.0                    

 MV Deceleration Time              147.0 ms               

 TR Peak Velocity                  397.7 cm/s             

 TR Peak Gradient                  63.3 mmHg              

 Right Ventricular Systolic Press  68.3 mmHg              

 

 

 FINDINGS 

 Left Ventricle 

 Left ventricular ejection fraction is estimated at 25-30 %. Mildly increased  

 septal wall thickness. Mildly increased left ventricular diastolic diameter.  

 Global left ventricular hypokinesis. Apical akinesis. Thrombus in [place] 

 

 Right Ventricle 

 Moderate right ventricular dilatation. Severe pulmonary hypertension. Right  

 ventricular systolic pressure estimated at 68 mm hg. 

 

 Right Atrium 

 Normal right atrial size. 

 

 Left Atrium 

 Mildly increased left atrial diameter. Severely increased left atrial volume.  

 Mildly increased left atrial area. 

 

 Mitral Valve 

 Structurally normal mitral valve. Mitral annular calcification. Moderate-to- 

 severe mitral regurgitation. 

 

 Aortic Valve 

 Trileaflet aortic valve. Mild aortic regurgitation. 

 

 Tricuspid Valve 

 Structurally normal tricuspid valve. Moderate tricuspid regurgitation. 

 

 Pulmonic Valve 

 Structurally normal pulmonic valve. Trace to mild pulmonic regurgitation. 

 

 Pericardium 

 Normal pericardium. 

 

 Aorta 

 Mild aortic dilatation at the level of the sinuses of valsalva 38 mm 

 

 CONCLUSIONS 

 Severe LV dysfunction with a large anteroapical and septal akinesis with an LV  

 thrombus which is fairly large 

 Previewed by:  

 Dr. Ortega Atkins MD 

 (Electronically Signed) 

 Final Date:      2024 11:34

## 2024-04-27 RX ADMIN — ISOSORBIDE MONONITRATE SCH MG: 30 TABLET, EXTENDED RELEASE ORAL at 08:34

## 2024-04-27 RX ADMIN — ASPIRIN 81 MG CHEWABLE TABLET SCH MG: 81 TABLET CHEWABLE at 08:34

## 2024-04-27 RX ADMIN — FUROSEMIDE SCH MG: 40 TABLET ORAL at 17:07

## 2024-04-27 RX ADMIN — METOPROLOL SUCCINATE SCH MG: 25 TABLET, EXTENDED RELEASE ORAL at 08:34

## 2024-04-27 RX ADMIN — APIXABAN SCH MG: 5 TABLET, FILM COATED ORAL at 09:42

## 2024-04-27 NOTE — P.PN
Subjective


Progress Note Date: 04/27/24











72-year-old male came in with shortness of breath progressively worsening along 

with orthopnea proximal nocturnal dyspnea patient therapy given severe 

congestive heart failure with elevated BNP and pulmonary edema on x-ray patient 

was started on IV Lasix patient has been evaluated troponins but stable at 0.8 

and BNP of 2400 patient appears to have some ST-T wave changes on EKG patient 

was then taken to Cath Lab found to have LAD and circumflex severe stenosis.  

Patient will undergo stenting of these blood vessels





4/27/2024





Patient is seen in follow-up with cardiology following for CHF exacerbation 

maintained on IV Lasix.  Cardiology following and transitioning to oral Lasix.  

Plan is for obtaining a LifeVest which will likely occur on Monday.  Patient is 

afebrile with no reports of chest pain or worsening shortness of breath.  

Patient is tolerating diet and denies any nausea or vomiting.  Patient reports 

to feeling extremely fatigued and minimally improved from admission.  Encouraged

increase activity as tolerated.  Await LifeVest.





Review of systems:


Constitutional:  reports of fatigue, no fever, or chills


Cardiovascular: No reports of chest pain or palpitations


Respiratory:  reports of shortness of breath with minimal exertion


GI: No reports of nausea, vomiting, or diarrhea, reports not much of an appetite


: No reports of dysuria or retention


Neurovascular: Generalized  weakness reported





All medications have been reviewed








PHYSICAL EXAMINATION: 





GENERAL: The patient is lethargic although arousable, alert and oriented x3, not

in any acute distress. Well developed, well nourished.  Ill-appearing


HEENT: Pupils are round and equally reacting to light. EOMI. No scleral icterus.

No conjunctival pallor. Normocephalic, atraumatic. No pharyngeal erythema. No 

thyromegaly. 


CARDIOVASCULAR: S1 and S2 muffled


PULMONARY: Diminished breath sounds bilaterally otherwise chest is clear to 

auscultation, no wheezing or crackles. 


ABDOMEN: Soft, nontender, nondistended, normoactive bowel sounds. No palpable 

organomegaly. 


MUSCULOSKELETAL: No joint swelling or deformity.


EXTREMITIES: No cyanosis, clubbing, or pedal edema. 


NEUROLOGICAL: Gross neurological examination did not reveal any focal deficits. 

Diffusely weak


SKIN: No rashes. 








Assessment:





-Congestive heart failure patient appears to have acute systolic dysfunction 

with pulm edema and acute exacerbation


-Ischemic cardiomyopathy


-Acute non-ST elevation MI


-Left ventricular thrombus


-Hypertension


-DVT prophylaxis: Patient is presently on IV heparin and being transition to 

Eliquis


-GI prophylaxis


-Full code





Plan:





Patient was transition from IV Lasix to oral Lasix with cardiology following.  

Continue current cardiac medications with plans on obtaining a LifeVest likely 

on Monday


Will follow-up on repeat labs


Patient has been transition to Eliquis and IV heparin discontinued


Encourage increase activity as tolerated


Overall prognosis is guarded


Patient will likely discharge home after receiving LifeVest





The impression and plan of care has been dictated by Jessi Grey, Nurse 

Practitioner as directed.





Dr. Og MD


I have performed a history and examination and MDM of this patient, discussed 

the same with the dictator, and  agree with the dictator's assessment and plan 

as written ,documented as a scribe. Based on total visit time,  I have performed

more than 50% of the visit.





Objective





- Vital Signs


Vital signs: 


                                   Vital Signs











Temp  97.6 F   04/27/24 08:21


 


Pulse  52 L  04/27/24 08:21


 


Resp  18   04/27/24 08:21


 


BP  122/73   04/27/24 08:21


 


Pulse Ox  96   04/27/24 08:21


 


FiO2      








                                 Intake & Output











 04/26/24 04/27/24 04/27/24





 18:59 06:59 18:59


 


Intake Total 465.598  240


 


Output Total 1200 400 


 


Balance -734.402 -400 240


 


Weight 86.183 kg 81.7 kg 


 


Intake:   


 


    


 


  Intake, IV Titration 165.598  





  Amount   


 


    Heparin Sod,Pork in 0.45% 165.598  





    NaCl 25,000 unit In 0.45   





    % NaCl 1 250ml.bag @ 11.   





    63 UNITS/KG/HR 10.023 mls   





    /hr IV .Q24H [facility], [facility] Rx#:   





    [id number]   


 


  Oral   240


 


Output:   


 


  Urine 1200 400 


 


Other:   


 


  Voiding Method  Toilet 





  Urinal 


 


  # Voids  2 














- Labs


CBC & Chem 7: 


                                 04/26/24 03:49





                                 04/26/24 03:49


Labs: 


                  Abnormal Lab Results - Last 24 Hours (Table)











  04/27/24 04/27/24 Range/Units





  02:55 06:03 


 


APTT  58.6 H  62.9 H  (22.0-30.0)  sec

## 2024-04-27 NOTE — P.PN
Subjective


Progress Note Date: 04/27/24





History of present illness:


This is a 74-year-old male with past medical history of hypertension, dyslipid

emia.  Patient presented to the hospital with new onset of CHF.  Symptoms have 

been developing over the past 2 weeks and progressively worsening with shortness

of breath primarily at nighttime as well as lower extremity edema.  He had no 

prior history of coronary artery disease or congestive heart failure.  Patient 

was started on IV Lasix. Patient underwent cardiac catheterization with Dr. MILA Golden found to have severe two-vessel coronary artery disease.  Consult was 

placed with cardiothoracic surgery for evaluation for CABG.  It was determined 

that patient was a poor candidate for CABG.  Echocardiogram reveals EF of 25 to 

30% and LV thrombus that is fairly large.





Physical examination:


Gen: This is a 74-year-old male in no acute distress


VS: reviewed


HEENT: Head is atraumatic, normocephalic. Pupils equal, round. Sclerae is 

anicteric. 


LUNGS: Good air entry bilaterally. No wheezes or rhonchi.  No intercostal 

retractions.


HEART: Regular rate and rhythm. No murmur. 


EXTREMITIES: No pedal edema.  No calf tenderness.


NEUROLOGICAL: Patient is awake, alert and oriented x3.


 


Assessment:


Acute non-ST elevated MI


Acute systolic heart failure


Ischemic cardiomyopathy


Severe two-vessel coronary artery disease


Hypertension


Hyperlipidemia





Plan:


Continue current cardiac medications: Aspirin 81 mg daily, atorvastatin 80 mg at

bedtime, Imdur 30 mg daily, lisinopril 20 mg daily, Toprol-XL 25 mg daily


Discontinue heparin drip and start patient on Eliquis 5 mg twice daily


Transition IV Lasix to oral 40 mg twice daily


Plan to obtain LifeVest on Monday with discharge most likely on Monday.


Further recommendations to follow based upon clinical course





Nurse practitioner note has been reviewed, I agree with documented findings and 

plan of care.  Patient was seen and examined.





Objective





- Vital Signs


Vital signs: 


                                   Vital Signs











Temp  97.6 F   04/27/24 08:21


 


Pulse  52 L  04/27/24 08:21


 


Resp  18   04/27/24 08:21


 


BP  122/73   04/27/24 08:21


 


Pulse Ox  96   04/27/24 08:21


 


FiO2      








                                 Intake & Output











 04/26/24 04/27/24 04/27/24





 18:59 06:59 18:59


 


Intake Total 465.598  240


 


Output Total 1200 400 


 


Balance -734.402 -400 240


 


Weight 86.183 kg 81.7 kg 


 


Intake:   


 


    


 


  Intake, IV Titration 165.598  





  Amount   


 


    Heparin Sod,Pork in 0.45% 165.598  





    NaCl 25,000 unit In 0.45   





    % NaCl 1 250ml.bag @ 11.   





    63 UNITS/KG/HR 10.023 mls   





    /hr IV .Q24H [facility] Rx#:   





    [id number]   


 


  Oral   240


 


Output:   


 


  Urine 1200 400 


 


Other:   


 


  Voiding Method  Toilet 





  Urinal 


 


  # Voids  2 














- Labs


CBC & Chem 7: 


                                 04/26/24 03:49





                                 04/26/24 03:49


Labs: 


                  Abnormal Lab Results - Last 24 Hours (Table)











  04/27/24 04/27/24 Range/Units





  02:55 06:03 


 


APTT  58.6 H  62.9 H  (22.0-30.0)  sec

## 2024-04-28 LAB
ANION GAP SERPL CALC-SCNC: 4 MMOL/L
BASOPHILS # BLD AUTO: 0.1 K/UL (ref 0–0.2)
BASOPHILS NFR BLD AUTO: 1 %
BUN SERPL-SCNC: 24 MG/DL (ref 9–20)
CALCIUM SPEC-MCNC: 9.7 MG/DL (ref 8.4–10.2)
CHLORIDE SERPL-SCNC: 106 MMOL/L (ref 98–107)
CO2 SERPL-SCNC: 26 MMOL/L (ref 22–30)
EOSINOPHIL # BLD AUTO: 0.5 K/UL (ref 0–0.7)
EOSINOPHIL NFR BLD AUTO: 7 %
ERYTHROCYTE [DISTWIDTH] IN BLOOD BY AUTOMATED COUNT: 4.88 M/UL (ref 4.3–5.9)
ERYTHROCYTE [DISTWIDTH] IN BLOOD: 13.6 % (ref 11.5–15.5)
GLUCOSE SERPL-MCNC: 91 MG/DL (ref 74–99)
HCT VFR BLD AUTO: 45.9 % (ref 39–53)
HGB BLD-MCNC: 14.7 GM/DL (ref 13–17.5)
LYMPHOCYTES # SPEC AUTO: 2.2 K/UL (ref 1–4.8)
LYMPHOCYTES NFR SPEC AUTO: 29 %
MAGNESIUM SPEC-SCNC: 2.1 MG/DL (ref 1.6–2.3)
MCH RBC QN AUTO: 30.1 PG (ref 25–35)
MCHC RBC AUTO-ENTMCNC: 31.9 G/DL (ref 31–37)
MCV RBC AUTO: 94.2 FL (ref 80–100)
MONOCYTES # BLD AUTO: 0.6 K/UL (ref 0–1)
MONOCYTES NFR BLD AUTO: 7 %
NEUTROPHILS # BLD AUTO: 4.1 K/UL (ref 1.3–7.7)
NEUTROPHILS NFR BLD AUTO: 55 %
PLATELET # BLD AUTO: 157 K/UL (ref 150–450)
POTASSIUM SERPL-SCNC: 4 MMOL/L (ref 3.5–5.1)
SODIUM SERPL-SCNC: 136 MMOL/L (ref 137–145)
WBC # BLD AUTO: 7.6 K/UL (ref 3.8–10.6)

## 2024-04-28 NOTE — P.PN
Subjective


Progress Note Date: 04/28/24





History of present illness:


This is a 74-year-old male with past medical history of hypertension, dyslipid

emia.  Patient presented to the hospital with new onset of CHF.  Symptoms have 

been developing over the past 2 weeks and progressively worsening with shortness

of breath primarily at nighttime as well as lower extremity edema.  He had no 

prior history of coronary artery disease or congestive heart failure.  Patient 

was started on IV Lasix. Patient underwent cardiac catheterization with Dr. MILA Golden found to have severe two-vessel coronary artery disease.  Consult was 

placed with cardiothoracic surgery for evaluation for CABG.  It was determined 

that patient was a poor candidate for CABG.  Echocardiogram reveals EF of 25 to 

30% and LV thrombus that is fairly large.





4/28


Heparin drip was discontinued yesterday and patient started on Eliquis for LV 

thrombus.  Also IV Lasix was transitioned to oral yesterday.  Patient denies any

new concerns today.  Plan is to obtain LifeVest tomorrow and discharged home 

following that.  Blood pressure 119/79, heart rate in the 60s.  Pulse ox 97% on 

room air.  BUN 24 creatinine 1.2.





Physical examination:


Gen: This is a 74-year-old male in no acute distress


VS: reviewed


HEENT: Head is atraumatic, normocephalic. Pupils equal, round. Sclerae is 

anicteric. 


LUNGS: Good air entry bilaterally. No wheezes or rhonchi.  No intercostal 

retractions.


HEART: Regular rate and rhythm. No murmur. 


EXTREMITIES: No pedal edema.  No calf tenderness.


NEUROLOGICAL: Patient is awake, alert and oriented x3.


 


Assessment:


Acute non-ST elevated MI


Acute systolic heart failure


Ischemic cardiomyopathy


LV thrombus


Severe two-vessel coronary artery disease


Hypertension


Hyperlipidemia





Plan:


Continue current cardiac medications: Aspirin 81 mg daily, atorvastatin 80 mg at

bedtime, Imdur 30 mg daily, lisinopril 20 mg daily, Toprol-XL 25 mg daily


Continue patient on Eliquis 5 mg twice daily


Continue Lasix oral 40 mg twice daily


Plan to obtain LifeVest on Monday with discharge most likely on Monday.


Further recommendations to follow based upon clinical course





Nurse practitioner note has been reviewed, I agree with documented findings and 

plan of care.  Patient was seen and examined.





Objective





- Vital Signs


Vital signs: 


                                   Vital Signs











Temp  97.4 F L  04/28/24 08:11


 


Pulse  62   04/28/24 08:11


 


Resp  18   04/28/24 08:11


 


BP  119/79   04/28/24 08:11


 


Pulse Ox  97   04/28/24 08:11


 


FiO2      








                                 Intake & Output











 04/27/24 04/28/24 04/28/24





 18:59 06:59 18:59


 


Intake Total 240  


 


Output Total 200 1450 


 


Balance 40 -1450 


 


Intake:   


 


  Oral 240  


 


Output:   


 


  Urine 200 1450 


 


Other:   


 


  Voiding Method Toilet Toilet 





 Urinal Urinal 














- Labs


CBC & Chem 7: 


                                 04/28/24 07:01





                                 04/28/24 07:01


Labs: 


                  Abnormal Lab Results - Last 24 Hours (Table)











  04/28/24 Range/Units





  07:01 


 


Sodium  136 L  (137-145)  mmol/L


 


BUN  24 H  (9-20)  mg/dL








                      Microbiology - Last 24 Hours (Table)











 04/26/24 13:13 Nasal Screen MRSA/MSSA - Final





 Nasal Swab

## 2024-04-28 NOTE — P.PN
Subjective


Progress Note Date: 04/28/24











72-year-old male came in with shortness of breath progressively worsening along 

with orthopnea proximal nocturnal dyspnea patient therapy given severe 

congestive heart failure with elevated BNP and pulmonary edema on x-ray patient 

was started on IV Lasix patient has been evaluated troponins but stable at 0.8 

and BNP of 2400 patient appears to have some ST-T wave changes on EKG patient 

was then taken to Cath Lab found to have LAD and circumflex severe stenosis.  

Patient will undergo stenting of these blood vessels





4/27/2024 Patient is seen in follow-up with cardiology following for CHF 

exacerbation maintained on IV Lasix.  Cardiology following and transitioning to 

oral Lasix.  Plan is for obtaining a LifeVest which will likely occur on Monday.

 Patient is afebrile with no reports of chest pain or worsening shortness of 

breath.  Patient is tolerating diet and denies any nausea or vomiting.  Patient 

reports to feeling extremely fatigued and minimally improved from admission.  

Encouraged increase activity as tolerated.  Await LifeVest.





4/28/24: Patient seen and evaluated bedside, blood work reviewed, renal profile 

and hemoglobin remained stable.  Potential discharge in next 24 hours waiting 

for life vest 








PHYSICAL EXAMINATION: 





GENERAL: The patient is lethargic although arousable, alert and oriented x3, not

in any acute distress. Well developed, well nourished.  Ill-appearing


HEENT: Pupils are round and equally reacting to light. EOMI. No scleral icterus.

No conjunctival pallor. Normocephalic, atraumatic. No pharyngeal erythema. No 

thyromegaly. 


CARDIOVASCULAR: S1 and S2 muffled


PULMONARY: Diminished breath sounds bilaterally otherwise chest is clear to 

auscultation, no wheezing or crackles. 


ABDOMEN: Soft, nontender, nondistended, normoactive bowel sounds. No palpable 

organomegaly. 


MUSCULOSKELETAL: No joint swelling or deformity.


EXTREMITIES: No cyanosis, clubbing, or pedal edema. 


NEUROLOGICAL: Gross neurological examination did not reveal any focal deficits. 

Diffusely weak


SKIN: No rashes. 








Assessment:





-Acute congestive heart failure systolic dysfunction


-Ischemic cardiomyopathy


-Acute non-ST elevation MI


-Multivessel coronary artery disease poor candidate for CABG


-Left ventricular thrombus


-Hypertension





* In regards to congestive heart failure continue patient on Lasix, transition 

  to oral Lasix, continue to monitor intake and output


* In regards to non-ST elevated MI s/p cardiac catheterization continue aspirin,

  Lipitor, Imdur, Toprol-XL


*  Patient underwent cardiac catheterization with Dr. MILA Golden found to have 

  severe two-vessel coronary artery disease.  Consult was placed with 

  cardiothoracic surgery for evaluation for CABG.  It was determined that 

  patient was a poor candidate for CABG.  Echocardiogram reveals EF of 25 to 30%

  and LV thrombus that is fairly large.


* In regards to LV thrombus continue patient on Eliquis











Objective





- Vital Signs


Vital signs: 


                                   Vital Signs











Temp  97.4 F L  04/28/24 08:11


 


Pulse  62   04/28/24 10:07


 


Resp  18   04/28/24 10:07


 


BP  119/79   04/28/24 08:11


 


Pulse Ox  97   04/28/24 08:11


 


FiO2      








                                 Intake & Output











 04/27/24 04/28/24 04/28/24





 18:59 06:59 18:59


 


Intake Total 240  358


 


Output Total 200 1450 


 


Balance 40 -1450 358


 


Intake:   


 


  Oral 240  358


 


Output:   


 


  Urine 200 1450 


 


Other:   


 


  Voiding Method Toilet Toilet Toilet





 Urinal Urinal Urinal














- Labs


CBC & Chem 7: 


                                 04/28/24 07:01





                                 04/28/24 07:01


Labs: 


                  Abnormal Lab Results - Last 24 Hours (Table)











  04/28/24 Range/Units





  07:01 


 


Sodium  136 L  (137-145)  mmol/L


 


BUN  24 H  (9-20)  mg/dL








                      Microbiology - Last 24 Hours (Table)











 04/26/24 13:13 Nasal Screen MRSA/MSSA - Final





 Nasal Swab

## 2024-04-29 VITALS
SYSTOLIC BLOOD PRESSURE: 104 MMHG | RESPIRATION RATE: 16 BRPM | HEART RATE: 51 BPM | TEMPERATURE: 97.6 F | DIASTOLIC BLOOD PRESSURE: 68 MMHG

## 2024-04-29 LAB
ANION GAP SERPL CALC-SCNC: 8 MMOL/L
BUN SERPL-SCNC: 26 MG/DL (ref 9–20)
CALCIUM SPEC-MCNC: 10.1 MG/DL (ref 8.4–10.2)
CHLORIDE SERPL-SCNC: 105 MMOL/L (ref 98–107)
CO2 SERPL-SCNC: 24 MMOL/L (ref 22–30)
ERYTHROCYTE [DISTWIDTH] IN BLOOD BY AUTOMATED COUNT: 4.8 M/UL (ref 4.3–5.9)
ERYTHROCYTE [DISTWIDTH] IN BLOOD: 13.3 % (ref 11.5–15.5)
GLUCOSE SERPL-MCNC: 100 MG/DL (ref 74–99)
HCT VFR BLD AUTO: 45 % (ref 39–53)
HGB BLD-MCNC: 14.4 GM/DL (ref 13–17.5)
MCH RBC QN AUTO: 29.9 PG (ref 25–35)
MCHC RBC AUTO-ENTMCNC: 32 G/DL (ref 31–37)
MCV RBC AUTO: 93.7 FL (ref 80–100)
PLATELET # BLD AUTO: 170 K/UL (ref 150–450)
POTASSIUM SERPL-SCNC: 4.2 MMOL/L (ref 3.5–5.1)
SODIUM SERPL-SCNC: 137 MMOL/L (ref 137–145)
WBC # BLD AUTO: 8.6 K/UL (ref 3.8–10.6)

## 2024-04-29 RX ADMIN — DAPAGLIFLOZIN SCH MG: 10 TABLET, FILM COATED ORAL at 11:26

## 2024-04-29 RX ADMIN — SPIRONOLACTONE SCH MG: 25 TABLET, FILM COATED ORAL at 11:26

## 2024-04-29 NOTE — P.PN
Subjective





HISTORY OF PRESENT ILLNESS: 





This is a 74-year-old male with past medical history of hypertension, 

dyslipidemia.  Patient presented to the hospital with new onset of CHF.  

Symptoms have been developing over the past 2 weeks and progressively worsening 

with shortness of breath primarily at nighttime as well as lower extremity 

edema.  He had no prior history of coronary artery disease or congestive heart 

failure.  Patient was started on IV Lasix. Patient underwent cardiac 

catheterization with Dr. MILA Golden found to have severe two-vessel coronary artery

disease.  Consult was placed with cardiothoracic surgery for evaluation for 

CABG.  It was determined that patient was a poor candidate for CABG.  

Echocardiogram reveals EF of 25 to 30% and LV thrombus that is fairly large.





4/28


Heparin drip was discontinued yesterday and patient started on Eliquis for LV 

thrombus.  Also IV Lasix was transitioned to oral yesterday.  Patient denies any

new concerns today.  Plan is to obtain LifeVest tomorrow and discharged home 

following that.  Blood pressure 119/79, heart rate in the 60s.  Pulse ox 97% on 

room air.  BUN 24 creatinine 1.2.





4/29/2024


Patient examined this morning at the bedside.  Patient denies chest pain or 

pressure.  He denies shortness of breath.  Vital signs are stable.  Patient is 

hoping to be discharged home today.





PHYSICAL EXAM: 


VITAL SIGNS: Reviewed.


GENERAL: Well-developed in no acute distress. 


NECK: Supple. No JVD or thyromegaly


LUNGS: Respirations even and unlabored. Lungs essentially clear to auscultation 

bilaterally.


HEART: Regular rate and rhythm.  S1 and S2 heard.


EXTREMITIES: Normal range of motion.  No clubbing or cyanosis.  Peripheral 

pulses intact.  No lower extremity edema





ASSESSMENT: 


Acute non-ST elevated MI


Acute systolic heart failure


Ischemic cardiomyopathy


LV thrombus


Severe two-vessel coronary artery disease, not a candidate for CABG


Hypertension


Hyperlipidemia








PLAN: 


Continue current cardiac medications


Add Aldactone 25 mg daily


Add Farxiga 10 mg daily


Recommend LifeVest at the time of discharge to prevent sudden cardiac death 

secondary to ischemic cardiomyopathy


Patient may be discharged home this afternoon pending placement of LifeVest


Patient to follow-up postdischarge with Dr. Golden








Nurse practitioner note has been reviewed by physician. Signing provider agrees 

with the documented findings, assessment, and plan of care documented by NP as a

scribe.








Objective





- Vital Signs


Vital signs: 


                                   Vital Signs











Temp  97.6 F   04/29/24 11:22


 


Pulse  51 L  04/29/24 11:22


 


Resp  16   04/29/24 11:22


 


BP  104/68   04/29/24 11:22


 


Pulse Ox  98   04/29/24 11:22


 


FiO2      








                                 Intake & Output











 04/28/24 04/29/24 04/29/24





 18:59 06:59 18:59


 


Intake Total 1016  118


 


Output Total 750 1400 


 


Balance 266 -1400 118


 


Weight  82.8 kg 


 


Intake:   


 


  Oral 1016  118


 


Output:   


 


  Urine 750 1400 


 


Other:   


 


  Voiding Method Toilet Toilet Toilet





 Urinal Urinal Urinal














- Labs


CBC & Chem 7: 


                                 04/29/24 06:40





                                 04/29/24 06:40


Labs: 


                  Abnormal Lab Results - Last 24 Hours (Table)











  04/29/24 Range/Units





  06:40 


 


BUN  26 H  (9-20)  mg/dL


 


Glucose  100 H  (74-99)  mg/dL

## 2024-04-29 NOTE — P.DS
Providers


Date of admission: 


04/25/24 20:33





Expected date of discharge: 04/29/24


Attending physician: 


Randy Gonsalez





Consults: 





                                        





04/25/24 20:31


Consult Physician Routine 


   Consulting Provider: [facility]


   Consult Reason/Comments: new onset chf, chest pain


   Do you want consulting provider notified?: Yes





04/26/24 11:32


Consult Physician Routine 


   Consulting Provider: Walt Mccain


   Consult Reason/Comments: CABG eval


   Do you want consulting provider notified?: Yes











Primary care physician: 


Oswal[facility] Course: 








72-year-old male came in with shortness of breath progressively worsening along 

with orthopnea proximal nocturnal dyspnea patient therapy given severe 

congestive heart failure with elevated BNP and pulmonary edema on x-ray patient 

was started on IV Lasix patient has been evaluated troponins but stable at 0.8 

and BNP of 2400 patient appears to have some ST-T wave changes on EKG patient 

was then taken to Cath Lab found to have LAD and circumflex severe stenosis.  

Patient will undergo stenting of these blood vessels


4/27/2024 Patient is seen in follow-up with cardiology following for CHF 

exacerbation maintained on IV Lasix.  Cardiology following and transitioning to 

oral Lasix.  Plan is for obtaining a LifeVest which will likely occur on Monday.

 Patient is afebrile with no reports of chest pain or worsening shortness of 

breath.  Patient is tolerating diet and denies any nausea or vomiting.  Patient 

reports to feeling extremely fatigued and minimally improved from admission.  

Encouraged increase activity as tolerated.  Await LifeVest.


4/28/24: Patient seen and evaluated bedside, blood work reviewed, renal profile 

and hemoglobin remained stable.  Potential discharge in next 24 hours waiting 

for life vest 


4/29/24 : Patient seen and evaluated at bedside, patient seen by cardiology, 

waiting for LifeVest, cardiac medications adjusted and new scripts provided 

continue guideline directed medical therapy








PHYSICAL EXAMINATION: 





GENERAL: The patient is lethargic although arousable, alert and oriented x3, not

in any acute distress. Well developed, well nourished.  Ill-appearing


HEENT: Pupils are round and equally reacting to light. EOMI. No scleral icterus.

No conjunctival pallor. Normocephalic, atraumatic. No pharyngeal erythema. No 

thyromegaly. 


CARDIOVASCULAR: S1 and S2 muffled


PULMONARY: Diminished breath sounds bilaterally otherwise chest is clear to 

auscultation, no wheezing or crackles. 


ABDOMEN: Soft, nontender, nondistended, normoactive bowel sounds. No palpable 

organomegaly. 


MUSCULOSKELETAL: No joint swelling or deformity.


EXTREMITIES: No cyanosis, clubbing, or pedal edema. 


NEUROLOGICAL: Gross neurological examination did not reveal any focal deficits. 

Diffusely weak


SKIN: No rashes. 








Assessment:





-Acute congestive heart failure systolic dysfunction


-Ischemic cardiomyopathy


-Acute non-ST elevation MI


-Multivessel coronary artery disease poor candidate for CABG


-Left ventricular thrombus


-Hypertension





* In regards to congestive heart failure continue patient on Lasix, transition 

  to oral Lasix, continue to monitor intake and output


* In regards to non-ST elevated MI s/p cardiac catheterization continue aspirin,

  Lipitor, Imdur, Toprol-XL


*  Patient underwent cardiac catheterization with Dr. MILA Golden found to have 

  severe two-vessel coronary artery disease.  


* Consult was placed with cardiothoracic surgery for evaluation for CABG.  It 

  was determined that patient was a poor candidate for CABG.  Echocardiogram 

  reveals EF of 25 to 30% and LV thrombus that is fairly large.


* In regards to LV thrombus continue patient on Eliquis





Patient Condition at Discharge: Fair





Plan - Discharge Summary


Discharge Rx Participant: No


New Discharge Prescriptions: 


New


   Aspirin 81 mg PO DAILY 30 Days #30 tab


   Apixaban [Eliquis] 5 mg PO BID 30 Days #60 tab


   Furosemide [Lasix] 40 mg PO BID@0900,1600 30 Days #60 tab


   Spironolactone [Aldactone] 25 mg PO DAILY 30 Days #30 tab


   Dapagliflozin Propanediol [Farxiga] 10 mg PO DAILY 30 Days #30 tab


   Isosorbide Mononitrate ER [Imdur] 30 mg PO DAILY 30 Days #30 tab


   Atorvastatin [Lipitor] 80 mg PO HS 30 Days #30 tab


   Metoprolol Succinate (ER) [Toprol XL] 25 mg PO DAILY 30 Days #30 tab





Continue


   Cholecalciferol [Vitamin D3 (25 Mcg = 1000 Iu)] 25 mcg PO DAILY


   lisinopriL [Zestril] 20 mg PO DAILY





Discontinued


   amLODIPine [Norvasc] 10 mg PO DAILY


   Simvastatin [Zocor] 20 mg PO DAILY


   Vit C/E/Zn/Coppr/Lutein/Zeaxan [Preservision Areds 2 Softgel] 1 cap PO DAILY


Discharge Medication List





Cholecalciferol [Vitamin D3 (25 Mcg = 1000 Iu)] 25 mcg PO DAILY 04/25/24 

[History]


lisinopriL [Zestril] 20 mg PO DAILY 04/25/24 [History]


Apixaban [Eliquis] 5 mg PO BID 30 Days #60 tab 04/29/24 [Rx]


Aspirin 81 mg PO DAILY 30 Days #30 tab 04/29/24 [Rx]


Atorvastatin [Lipitor] 80 mg PO HS 30 Days #30 tab 04/29/24 [Rx]


Dapagliflozin Propanediol [Farxiga] 10 mg PO DAILY 30 Days #30 tab 04/29/24 [Rx]


Furosemide [Lasix] 40 mg PO BID@0900,1600 30 Days #60 tab 04/29/24 [Rx]


Isosorbide Mononitrate ER [Imdur] 30 mg PO DAILY 30 Days #30 tab 04/29/24 [Rx]


Metoprolol Succinate (ER) [Toprol XL] 25 mg PO DAILY 30 Days #30 tab 04/29/24 

[Rx]


Spironolactone [Aldactone] 25 mg PO DAILY 30 Days #30 tab 04/29/24 [Rx]








Follow up Appointment(s)/Referral(s): 


Oswaldo Saldaña DO [Primary Care Provider] - 1-2 days

## 2024-05-01 NOTE — CT
EXAMINATION TYPE: CT chest wo con

CT DLP: 387.3 mGycm, Automated exposure control for dose reduction was used.

 

DATE OF EXAM: 4/26/2024 2:17 PM

 

COMPARISON: Chest radiograph from same day. Multiple CTs of the chest with most recent on  .

 

CLINICAL INDICATION:Male, 74 years old with history of Preop cardiac surgery, evaluation of aorta; PH
H, Preop cardiac surgery, evaluation of aorta

 

TECHNIQUE: Multiple axial images were obtained through the chest. Sagittal and coronal reformats were
 created for review.

Contrast used: mL of (None if empty)

Oral contrast used: (None if empty)

 

FINDINGS: 

LUNGS/ PLEURA: The lung parenchyma appears unremarkable.    Very small bilateral pleural effusions.

AIRWAY: Patent and unremarkable.

HEART: The heart is mildly increased in size.. Mild calcific plaque in the distal aortic arch. No rubén
cific plaque seen in the ascending aorta. Almost no plaque in the descending thoracic aorta. Extensiv
e calcific coronary artery disease

MEDIASTINUM: No gross evidence of adenopathy.

VASCULATURE:  No aortic aneurysm. 

MUSCULOSKELETAL: No acute osseous abnormalities

SOFT TISSUES/LYMPH NODES: Unremarkable.

LOWER NECK: No significant findings.

UPPER ABDOMEN: No significant findings. Surgical clips in the right quadrant retroperitoneum.

 

IMPRESSION:

 

Coronary artery disease.

 

No calcific atherosclerotic change in the ascending aorta.

 

Very small bilateral pleural effusions.

 

Follow up recommendations for incidental pulmonary nodules, if there are any, are per Fleischner?s [employer] or [employer]. 

https://radiopaedia.org/articles/fleischner-society-pulmonary-nodule-recommendations-1?lang=us

## 2024-06-11 ENCOUNTER — HOSPITAL ENCOUNTER (OUTPATIENT)
Dept: HOSPITAL 47 - EC | Age: 74
Setting detail: OBSERVATION
LOS: 1 days | Discharge: HOME | End: 2024-06-12
Attending: INTERNAL MEDICINE | Admitting: INTERNAL MEDICINE
Payer: MEDICARE

## 2024-06-11 VITALS — BODY MASS INDEX: 26.9 KG/M2

## 2024-06-11 DIAGNOSIS — Z79.899: ICD-10-CM

## 2024-06-11 DIAGNOSIS — I25.5: ICD-10-CM

## 2024-06-11 DIAGNOSIS — Z79.82: ICD-10-CM

## 2024-06-11 DIAGNOSIS — Z90.5: ICD-10-CM

## 2024-06-11 DIAGNOSIS — E78.5: ICD-10-CM

## 2024-06-11 DIAGNOSIS — I13.0: ICD-10-CM

## 2024-06-11 DIAGNOSIS — N17.9: Primary | ICD-10-CM

## 2024-06-11 DIAGNOSIS — Z85.528: ICD-10-CM

## 2024-06-11 DIAGNOSIS — Z95.811: ICD-10-CM

## 2024-06-11 DIAGNOSIS — I51.3: ICD-10-CM

## 2024-06-11 DIAGNOSIS — N18.30: ICD-10-CM

## 2024-06-11 DIAGNOSIS — Z79.01: ICD-10-CM

## 2024-06-11 DIAGNOSIS — I25.10: ICD-10-CM

## 2024-06-11 DIAGNOSIS — R00.1: ICD-10-CM

## 2024-06-11 DIAGNOSIS — M19.90: ICD-10-CM

## 2024-06-11 DIAGNOSIS — Z79.84: ICD-10-CM

## 2024-06-11 DIAGNOSIS — Z87.891: ICD-10-CM

## 2024-06-11 DIAGNOSIS — I25.2: ICD-10-CM

## 2024-06-11 DIAGNOSIS — I50.22: ICD-10-CM

## 2024-06-11 DIAGNOSIS — J45.909: ICD-10-CM

## 2024-06-11 LAB
ALBUMIN SERPL-MCNC: 4.1 G/DL (ref 3.5–5)
ALP SERPL-CCNC: 46 U/L (ref 38–126)
ALT SERPL-CCNC: 22 U/L (ref 4–49)
ANION GAP SERPL CALC-SCNC: 7 MMOL/L
AST SERPL-CCNC: 25 U/L (ref 17–59)
BASOPHILS # BLD AUTO: 0 K/UL (ref 0–0.2)
BASOPHILS NFR BLD AUTO: 1 %
BUN SERPL-SCNC: 76 MG/DL (ref 9–20)
CALCIUM SPEC-MCNC: 10.1 MG/DL (ref 8.4–10.2)
CHLORIDE SERPL-SCNC: 107 MMOL/L (ref 98–107)
CO2 SERPL-SCNC: 21 MMOL/L (ref 22–30)
EOSINOPHIL # BLD AUTO: 0.1 K/UL (ref 0–0.7)
EOSINOPHIL NFR BLD AUTO: 3 %
ERYTHROCYTE [DISTWIDTH] IN BLOOD BY AUTOMATED COUNT: 4.22 M/UL (ref 4.3–5.9)
ERYTHROCYTE [DISTWIDTH] IN BLOOD: 12.6 % (ref 11.5–15.5)
GLUCOSE SERPL-MCNC: 93 MG/DL (ref 74–99)
HCT VFR BLD AUTO: 37.1 % (ref 39–53)
HGB BLD-MCNC: 12.5 GM/DL (ref 13–17.5)
LYMPHOCYTES # SPEC AUTO: 1.2 K/UL (ref 1–4.8)
LYMPHOCYTES NFR SPEC AUTO: 30 %
MAGNESIUM SPEC-SCNC: 2.1 MG/DL (ref 1.6–2.3)
MCH RBC QN AUTO: 29.7 PG (ref 25–35)
MCHC RBC AUTO-ENTMCNC: 33.8 G/DL (ref 31–37)
MCV RBC AUTO: 88 FL (ref 80–100)
MONOCYTES # BLD AUTO: 0.3 K/UL (ref 0–1)
MONOCYTES NFR BLD AUTO: 7 %
NEUTROPHILS # BLD AUTO: 2.5 K/UL (ref 1.3–7.7)
NEUTROPHILS NFR BLD AUTO: 58 %
PLATELET # BLD AUTO: 130 K/UL (ref 150–450)
POTASSIUM SERPL-SCNC: 4.8 MMOL/L (ref 3.5–5.1)
PROT SERPL-MCNC: 6.5 G/DL (ref 6.3–8.2)
SODIUM SERPL-SCNC: 135 MMOL/L (ref 137–145)
WBC # BLD AUTO: 4.2 K/UL (ref 3.8–10.6)

## 2024-06-11 PROCEDURE — 99285 EMERGENCY DEPT VISIT HI MDM: CPT

## 2024-06-11 PROCEDURE — 83735 ASSAY OF MAGNESIUM: CPT

## 2024-06-11 PROCEDURE — 80053 COMPREHEN METABOLIC PANEL: CPT

## 2024-06-11 PROCEDURE — 80048 BASIC METABOLIC PNL TOTAL CA: CPT

## 2024-06-11 PROCEDURE — 93005 ELECTROCARDIOGRAM TRACING: CPT

## 2024-06-11 PROCEDURE — 71046 X-RAY EXAM CHEST 2 VIEWS: CPT

## 2024-06-11 PROCEDURE — 85025 COMPLETE CBC W/AUTO DIFF WBC: CPT

## 2024-06-11 PROCEDURE — 93308 TTE F-UP OR LMTD: CPT

## 2024-06-11 PROCEDURE — 36415 COLL VENOUS BLD VENIPUNCTURE: CPT

## 2024-06-11 PROCEDURE — 76770 US EXAM ABDO BACK WALL COMP: CPT

## 2024-06-11 PROCEDURE — 84100 ASSAY OF PHOSPHORUS: CPT

## 2024-06-11 RX ADMIN — CEFAZOLIN STA MLS/HR: 330 INJECTION, POWDER, FOR SOLUTION INTRAMUSCULAR; INTRAVENOUS at 10:30

## 2024-06-11 RX ADMIN — ATORVASTATIN CALCIUM SCH MG: 80 TABLET, FILM COATED ORAL at 20:33

## 2024-06-11 RX ADMIN — APIXABAN SCH MG: 5 TABLET, FILM COATED ORAL at 20:33

## 2024-06-11 NOTE — P.HPIM
History of Present Illness


H&P Date: 06/11/24


Chief Complaint: Abnormal lab





Patient is a 72-year-old male with a past medical history of ischemic 

cardiomyopathy ejection fraction 25 to 30% and LV thrombus which is fairly large

on anticoagulation with Eliquis, multivessel coronary artery disease/poor 

candidate for CABG, left atrial thrombus and hypertension and other medical 

problems.  Also has history of right renal mass status post nephrectomy..


Patient was sent to ER due to abnormal laboratory data.  Patient had laboratory 

workup done by his primary care physician few days ago and does have follow-up 

appointment with Dr. Fontana today and was found his elevated BUN/creatinine and 

also potassium level.  Patient sent to ER.


Patient was recently discharged from the hospital on 4/29/2024 due to acute CHF 

and non-ST elevated MI.


Patient had a cardiac catheterization showed multivessel coronary disease and 

poor candidate for CABG as per CT surgery.  Maximal medical therapy was 

recommended.


Otherwise patient denies any complaints of chest pain or shortness of breath.  

Denies any worsening leg swelling.  No nausea vomiting abdominal pain or 

diarrhea.


Patient has been taking Lasix 40 mg twice daily and also on Aldactone at home.


Chest x-ray showed no evidence for acute pulmonary disease.


EKG showed sinus bradycardia with possible left lateral MI.


Laboratory test showed WBC 4.2 hemoglobin 12.5 and platelets 130 sodium 135 

potassium 4.8 chloride 107 bicarb is 21 BUN 76 and creatinine 1.76 and calcium 

10.1 magnesium 2.1 liver enzymes are not elevated.








Review of Systems





Constitutional: Patient denies any fever or chills .  No generalized weakness or

weight loss.  


Abdomen: Patient denied nausea vomiting and diarrhea and abdominal pain.


Cardiovascular: Patient denies any chest pain or short of breath no 

palpitations.


Respiratory: patient denied any cough or sputum production.  No shortness of 

breath


Neurologic: Patient denied any numbness or tingling. no headache.


Musculoskeletal: Patient denies any complaints of joint swelling or deformity.


Skin: Negative


Psychiatric: Negative


Endocrine: No heat or cold intolerance.  No recent weight gain.


Genitourinary: No dysuria or hematuria.


All other 14 point ROS negative except the above





Past Medical History


Past Medical History: Asthma, Cancer, Hyperlipidemia, Hypertension, Osteoarthr

itis (OA)


Additional Past Medical History / Comment(s): Kidney CA


History of Any Multi-Drug Resistant Organisms: None Reported


Additional Past Surgical History / Comment(s): Right nephrectomy, cataract 

removed R eye


Past Anesthesia/Blood Transfusion Reactions: No Reported Reaction


Past Psychological History: No Psychological Hx Reported


Smoking Status: Former smoker


Past Alcohol Use History: Rare


Past Drug Use History: None Reported





- Past Family History


  ** Mother


Family Medical History: Cancer


Additional Family Medical History / Comment(s): Anemia





  ** Father


Family Medical History: Myocardial Infarction (MI)





Medications and Allergies


                                Home Medications











 Medication  Instructions  Recorded  Confirmed  Type


 


Cholecalciferol [Vitamin D3 (25 25 mcg PO DAILY 04/25/24 06/11/24 History





Mcg = 1000 Iu)]    


 


lisinopriL [Zestril] 20 mg PO DAILY 04/25/24 06/11/24 History


 


Apixaban [Eliquis] 5 mg PO BID 30 Days #60 tab 04/29/24 06/11/24 Rx


 


Aspirin 81 mg PO DAILY 30 Days #30 tab 04/29/24 06/11/24 Rx


 


Atorvastatin [Lipitor] 80 mg PO HS 30 Days #30 tab 04/29/24 06/11/24 Rx


 


Dapagliflozin Propanediol [Farxiga] 10 mg PO DAILY 30 Days #30 tab 04/29/24 06/11/24 Rx


 


Furosemide [Lasix] 40 mg PO BID@0900,1600 30 Days #60 04/29/24 06/11/24 Rx





 tab   


 


Isosorbide Mononitrate ER [Imdur] 30 mg PO DAILY 30 Days #30 tab 04/29/24 06/11/24 Rx


 


Metoprolol Succinate (ER) [Toprol 25 mg PO DAILY 30 Days #30 tab 04/29/24 06/11/24 Rx





XL]    


 


Spironolactone [Aldactone] 25 mg PO DAILY 30 Days #30 tab 04/29/24 06/11/24 Rx








                                    Allergies











Allergy/AdvReac Type Severity Reaction Status Date / Time


 


No Known Allergies Allergy   Verified 06/11/24 11:23














Physical Exam


Vitals: 


                                   Vital Signs











  Temp Pulse Resp BP Pulse Ox


 


 06/11/24 13:39  98.3 F  50 L  20  95/59  100


 


 06/11/24 12:02  97.9 F  51 L  17  99/68  99


 


 06/11/24 10:12  98.1 F  57 L   110/68  98


 


 06/11/24 09:39  97.3 F L  61  16  99/63  100








                                Intake and Output











 06/10/24 06/11/24 06/11/24





 22:59 06:59 14:59


 


Other:   


 


  Weight   80.286 kg














PHYSICAL EXAMINATION: 


Patient is lying in the bed comfortably, no acute distress, awake alert and 

oriented.. 


HEENT: Normocephalic. Neck is supple. Pupils reactive. Nostrils clear. Oral 

cavity is moist. 


Neck reveals no JVD, carotid bruits, or thyromegaly. 


CHEST EXAMINATION: Trachea is central. Symmetrical expansion. Lung fields clear 

to auscultation and percussion. 


CARDIAC: Normal S1, S2 with no gallops. No murmurs 


ABDOMEN: Soft. Bowel sounds normal. No organomegaly. No abdominal bruits. 


Extremities: reveal no edema.  No clubbing or cyanosis


Neurologically awake, alert, oriented x3 with well-coordinated movements.  No 

focal deficits noted


Skin: No rash or skin lesions. 


Psychiatric: Coperative.  Nonsuicidal


Musculoskeletal: No joint swelling or deformity.  Normal range of motion.








Results


CBC & Chem 7: 


                                 06/11/24 10:24





                                 06/11/24 10:24


Labs: 


                  Abnormal Lab Results - Last 24 Hours (Table)











  06/11/24 06/11/24 Range/Units





  10:24 10:24 


 


RBC  4.22 L   (4.30-5.90)  m/uL


 


Hgb  12.5 L   (13.0-17.5)  gm/dL


 


Hct  37.1 L   (39.0-53.0)  %


 


Plt Count  130 L   (150-450)  k/uL


 


Sodium   135 L  (137-145)  mmol/L


 


Carbon Dioxide   21 L  (22-30)  mmol/L


 


BUN   76 H  (9-20)  mg/dL


 


Creatinine   1.76 H  (0.66-1.25)  mg/dL














Thrombosis Risk Factor Assmnt





- DVT/VTE Prophylaxis


DVT/VTE Prophylaxis: Pharmacologic Prophylaxis ordered





Assessment and Plan


Assessment: 








Acute kidney injury likely vasomotor nephropathy with diuresis.  Creatinine 1.76

 on admission.  Baseline 1.2


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


Ischemic cardiomyopathy with ejection fraction 25 to 30%.  Patient is on 

LifeVest currently.


LV thrombus which is fairly large.  As per TTE on 4/25/2024.  Currently on 

anticoagulation with Eliquis.


Chronic CHF with systolic dysfunction.


Hypertension


Hyperlipidemia


History of right renal cancer status post nephrectomy


Osteoarthritis


Asthma not in exacerbation


Prior history of smoking


DVT prophylaxis patient is already on anticoagulation





Plan:


Patient will be continued on telemonitoring.  Lasix, lisinopril, oxycodone and 

spironolactone is on hold due to acute kidney injury.


Continue gentle IV hydration and follow-up renal function.


Continue with Imdur, metoprolol and aspirin, statins and anticoagulation with 

Eliquis.  Cardiology was consulted for evaluation.  Continue to follow closely. 

 Prognosis guarded.


Time with Patient: Greater than 30

## 2024-06-11 NOTE — XR
EXAMINATION TYPE: XR chest 2V

 

DATE OF EXAM: 6/11/2024

 

COMPARISON: 4/25/2024

 

HISTORY: Shortness of breath

 

TECHNIQUE:  Frontal and lateral views of the chest are obtained.

 

FINDINGS:

 

Scattered senescent parenchymal changes noted. Hyperinflation compatible with COPD. 

 

No evidence for infiltrate. No evidence for atelectasis.

 

Heart size is stable.

 

Mediastinal structures are stable and grossly unremarkable.

 

No evidence for hilar prominence.

 

Degenerative changes dorsal spine. 

 

IMPRESSION:

1. No evidence for acute pulmonary disease.

## 2024-06-11 NOTE — ED
General Adult HPI





- General


Chief complaint: Recheck/Abnormal Lab/Rx


Stated complaint: abd labs-Kidney


Time Seen by Provider: 06/11/24 09:50


Source: patient, family, RN notes reviewed


Mode of arrival: ambulatory


Limitations: no limitations





- History of Present Illness


Initial comments: 





Patient is a pleasant 74-year-old male present to the emergency department with 

concern for lab abnormality.  Patient had follow-up appointment with his 

cardiologist Dr. Fontana.  Patient had abnormal potassium, BUN and creatinine.  

Patient only has 1 kidney secondary to history of kidney cancer.  Patient states

otherwise he feels fine and has no complaints.  Patient was in the hospital a 

couple months ago and started several new medications including diuretic.





- Related Data


                                Home Medications











 Medication  Instructions  Recorded  Confirmed


 


Cholecalciferol [Vitamin D3 (25 25 mcg PO DAILY 04/25/24 06/11/24





Mcg = 1000 Iu)]   


 


lisinopriL [Zestril] 20 mg PO DAILY 04/25/24 06/11/24








                                  Previous Rx's











 Medication  Instructions  Recorded


 


Apixaban [Eliquis] 5 mg PO BID 30 Days #60 tab 04/29/24


 


Aspirin 81 mg PO DAILY 30 Days #30 tab 04/29/24


 


Atorvastatin [Lipitor] 80 mg PO HS 30 Days #30 tab 04/29/24


 


Dapagliflozin Propanediol [Farxiga] 10 mg PO DAILY 30 Days #30 tab 04/29/24


 


Furosemide [Lasix] 40 mg PO BID@0900,1600 30 Days #60 04/29/24





 tab 


 


Isosorbide Mononitrate ER [Imdur] 30 mg PO DAILY 30 Days #30 tab 04/29/24


 


Metoprolol Succinate (ER) [Toprol 25 mg PO DAILY 30 Days #30 tab 04/29/24





XL]  


 


Spironolactone [Aldactone] 25 mg PO DAILY 30 Days #30 tab 04/29/24











                                    Allergies











Allergy/AdvReac Type Severity Reaction Status Date / Time


 


No Known Allergies Allergy   Verified 06/11/24 11:23














Review of Systems


ROS Statement: 


Those systems with pertinent positive or pertinent negative responses have been 

documented in the HPI.





ROS Other: All systems not noted in ROS Statement are negative.


Constitutional: Denies: fever


Eyes: Denies: eye pain


ENT: Denies: ear pain


Respiratory: Denies: cough, dyspnea


Cardiovascular: Denies: chest pain


Endocrine: Denies: fatigue


Gastrointestinal: Denies: abdominal pain





Past Medical History


Past Medical History: Asthma, Cancer, Hyperlipidemia, Hypertension, 

Osteoarthritis (OA)


Additional Past Medical History / Comment(s): Kidney CA


History of Any Multi-Drug Resistant Organisms: None Reported


Additional Past Surgical History / Comment(s): Right nephrectomy, cataract 

removed R eye


Past Anesthesia/Blood Transfusion Reactions: No Reported Reaction


Past Psychological History: No Psychological Hx Reported


Smoking Status: Former smoker


Past Alcohol Use History: Rare


Past Drug Use History: None Reported





- Past Family History


  ** Mother


Family Medical History: Cancer


Additional Family Medical History / Comment(s): Anemia





  ** Father


Family Medical History: Myocardial Infarction (MI)





General Exam


Limitations: no limitations


General appearance: alert, in no apparent distress


Head exam: Present: normocephalic


Eye exam: Present: normal appearance


ENT exam: Present: normal oropharynx.  Absent: mucous membranes dry


Neck exam: Present: normal inspection


Respiratory exam: Present: normal lung sounds bilaterally


Cardiovascular Exam: Present: regular rate, normal rhythm


GI/Abdominal exam: Present: soft.  Absent: tenderness


Extremities exam: Present: normal inspection


Back exam: Present: normal inspection


Neurological exam: Present: alert


Psychiatric exam: Present: normal affect, normal mood


Skin exam: Present: normal color





Course


                                   Vital Signs











  06/11/24 06/11/24





  09:39 10:12


 


Temperature 97.3 F L 98.1 F


 


Pulse Rate 61 57 L


 


Respiratory 16 





Rate  


 


Blood Pressure 99/63 110/68


 


O2 Sat by Pulse 100 98





Oximetry  














EKG Findings





- EKG Results:


EKG: interpreted by ERMD (Inferior Q waves.  Nonspecific ST-T.), sinus rhythm, 

normal axis


EKG shows: bradycardia





Medical Decision Making





- Medical Decision Making





Was pt. sent in by a medical professional or institution (, PA, NP, urgent 

care, hospital, or nursing home...) When possible be specific


@  -Patient was sent from Dr. Fontana's office.


Did you speak to anyone other than the patient for history (EMS, parent, family,

 police, friend...)? What history was obtained from this source 


@  -I did speak with cardiologist Dr. Fontana regarding patient's labs


Did you review nursing and triage notes (agree or disagree)?  Why? 


@  -I reviewed and agree with nursing and triage notes


Were old charts reviewed (outside hosp., previous admission, EMS record, old 

EKG, old radiological studies, urgent care reports/EKG's, nursing home records)?

 Report findings 


@  -Previous labs reviewed including potassium, BUN and creatinine


Differential Diagnosis (chest pain, altered mental status, abdominal pain women,

 abdominal pain men, vaginal bleeding, weakness, fever, dyspnea, syncope, 

headache, dizziness, GI bleed, back pain, seizure, CVA, palpatations, mental 

health, musculoskeletal)? 


@  -Differential Weakness:


Hypoglycemia, shock, sepsis, hyponatremia, anemia, infection, MI, ETOH, adverse 

medicine reaction, overdose, stroke, this is not meant to be an all-inclusive 

list.





EKG interpreted by me (3pts min.).


@  -As above


X-rays interpreted by me (1pt min.).


@  -Chest x-ray shows no acute process.  LifeVest is present.


CT interpreted by me (1pt min.).


@  -None done


U/S interpreted by me (1pt. min.).


@  -None done


What testing was considered but not performed or refused? (CT, X-rays, U/S, 

labs)? Why?


@  -None


What meds were considered but not given or refused? Why?


@  -Consider treatment for hyperkalemia however repeat potassium today is 

improved


Did you discuss the management of the patient with other professionals 

(professionals i.e. , PA, NP, lab, RT, psych nurse, , , 

teacher, , )? Give summary


@  -Case also discussed with Dr. Stephens who will admit covering Dr. Saldaña


Was smoking cessation discussed for >3mins.?


@  -No


Was critical care preformed (if so, how long)?


@  -No


Were there social determinants of health that impacted care today? How? (Homel

essness, low income, unemployed, alcoholism, drug addiction, transportation, low

 edu. Level, literacy, decrease access to med. care, detention, rehab)?


@  -No


Was there de-escalation of care discussed even if they declined (Discuss DNR or 

withdrawal of care, Hospice)? DNR status


@  -No


What co-morbidities impacted this encounter? (DM, HTN, Smoking, COPD, CAD, 

Cancer, CVA, ARF, Chemo, Hep., AIDS, mental health diagnosis, sleep apnea, 

morbid obesity)?


@  -Patient only has 1 functioning kidney secondary to history of renal cancer


Was patient admitted / discharged? Hospital course, mention meds given and 

route, prescriptions, significant lab abnormalities, going to OR and other 

pertinent info.


@  -Patient presents with abnormal labs.  Repeat blood work slightly improved.  

Patient will be admitted with hydration and further testing.  Admission orders 

written.


Undiagnosed new problem with uncertain prognosis?


@  -No


Drug Therapy requiring intensive monitoring for toxicity (Heparin, Nitro, 

Insulin, Cardizem)?


@  -No


Were any procedures done?


@  -No


Diagnosis/symptom?


@  -Acute kidney injury


Acute, or Chronic, or Acute on Chronic?


@  -Acute


Uncomplicated (without systemic symptoms) or Complicated (systemic symptoms)?


@  -Default


Side effects of treatment?


@  -No


Exacerbation, Progression, or Severe Exacerbation?


@  -No


Poses a threat to life or bodily function? How? (Chest pain, USA, MI, pneumonia,

 PE, COPD, DKA, ARF, appy, cholecystitis, CVA, Diverticulitis, Homicidal, 

Suicidal, threat to staff... and all critical care pts)


@  -Threat to renal function








- Lab Data


Result diagrams: 


                                 06/11/24 10:24





                                 06/11/24 10:24


                                   Lab Results











  06/11/24 06/11/24 Range/Units





  10:24 10:24 


 


WBC  4.2   (3.8-10.6)  k/uL


 


RBC  4.22 L   (4.30-5.90)  m/uL


 


Hgb  12.5 L   (13.0-17.5)  gm/dL


 


Hct  37.1 L   (39.0-53.0)  %


 


MCV  88.0  D   (80.0-100.0)  fL


 


MCH  29.7   (25.0-35.0)  pg


 


MCHC  33.8   (31.0-37.0)  g/dL


 


RDW  12.6   (11.5-15.5)  %


 


Plt Count  130 L   (150-450)  k/uL


 


MPV  8.1   


 


Neutrophils %  58   %


 


Lymphocytes %  30   %


 


Monocytes %  7   %


 


Eosinophils %  3   %


 


Basophils %  1   %


 


Neutrophils #  2.5   (1.3-7.7)  k/uL


 


Lymphocytes #  1.2   (1.0-4.8)  k/uL


 


Monocytes #  0.3   (0-1.0)  k/uL


 


Eosinophils #  0.1   (0-0.7)  k/uL


 


Basophils #  0.0   (0-0.2)  k/uL


 


Sodium   135 L  (137-145)  mmol/L


 


Potassium   4.8  (3.5-5.1)  mmol/L


 


Chloride   107  ()  mmol/L


 


Carbon Dioxide   21 L  (22-30)  mmol/L


 


Anion Gap   7  mmol/L


 


BUN   76 H  (9-20)  mg/dL


 


Creatinine   1.76 H  (0.66-1.25)  mg/dL


 


Est GFR (CKD-EPI)AfAm   44  (>60 ml/min/1.73 sqM)  


 


Est GFR (CKD-EPI)NonAf   38  (>60 ml/min/1.73 sqM)  


 


Glucose   93  (74-99)  mg/dL


 


Calcium   10.1  (8.4-10.2)  mg/dL


 


Magnesium   2.1  (1.6-2.3)  mg/dL


 


Total Bilirubin   1.1  (0.2-1.3)  mg/dL


 


AST   25  (17-59)  U/L


 


ALT   22  (4-49)  U/L


 


Alkaline Phosphatase   46  ()  U/L


 


Total Protein   6.5  (6.3-8.2)  g/dL


 


Albumin   4.1  (3.5-5.0)  g/dL














Disposition


Clinical Impression: 


 Acute kidney injury





Disposition: ADMITTED AS IP TO THIS HOSP


Is patient prescribed a controlled substance at d/c from ED?: No


Referrals: 


Oswaldo Saldaña DO [Primary Care Provider] - 1-2 days


Time of Disposition: 11:34

## 2024-06-12 VITALS
RESPIRATION RATE: 16 BRPM | TEMPERATURE: 97.9 F | SYSTOLIC BLOOD PRESSURE: 107 MMHG | HEART RATE: 69 BPM | DIASTOLIC BLOOD PRESSURE: 64 MMHG

## 2024-06-12 LAB
ANION GAP SERPL CALC-SCNC: 9.8 MMOL/L (ref 4–12)
BASOPHILS # BLD AUTO: 0.03 X 10*3/UL (ref 0–0.1)
BASOPHILS NFR BLD AUTO: 0.6 %
BUN SERPL-SCNC: 71.6 MG/DL (ref 9–27)
BUN/CREAT SERPL: 39.78 RATIO (ref 12–20)
CALCIUM SPEC-MCNC: 9.4 MG/DL (ref 8.7–10.3)
CHLORIDE SERPL-SCNC: 107 MMOL/L (ref 96–109)
CO2 SERPL-SCNC: 17.2 MMOL/L (ref 21.6–31.8)
EOSINOPHIL # BLD AUTO: 0.21 X 10*3/UL (ref 0.04–0.35)
EOSINOPHIL NFR BLD AUTO: 4 %
ERYTHROCYTE [DISTWIDTH] IN BLOOD BY AUTOMATED COUNT: 3.87 X 10*6/UL (ref 4.4–5.6)
ERYTHROCYTE [DISTWIDTH] IN BLOOD: 12.6 % (ref 11.5–14.5)
GLUCOSE SERPL-MCNC: 89 MG/DL (ref 70–110)
HCT VFR BLD AUTO: 35.2 % (ref 39.6–50)
HGB BLD-MCNC: 11.5 G/DL (ref 13–17)
IMM GRANULOCYTES BLD QL AUTO: 0.2 %
LYMPHOCYTES # SPEC AUTO: 2.1 X 10*3/UL (ref 0.9–5)
LYMPHOCYTES NFR SPEC AUTO: 40.1 %
MAGNESIUM SPEC-SCNC: 2.1 MG/DL (ref 1.5–2.4)
MCH RBC QN AUTO: 29.7 PG (ref 27–32)
MCHC RBC AUTO-ENTMCNC: 32.7 G/DL (ref 32–37)
MCV RBC AUTO: 91 FL (ref 80–97)
MONOCYTES # BLD AUTO: 0.42 X 10*3/UL (ref 0.2–1)
MONOCYTES NFR BLD AUTO: 8 %
NEUTROPHILS # BLD AUTO: 2.47 X 10*3/UL (ref 1.8–7.7)
NEUTROPHILS NFR BLD AUTO: 47.1 %
NRBC BLD AUTO-RTO: 0 X 10*3/UL (ref 0–0.01)
PLATELET # BLD AUTO: 115 X 10*3/UL (ref 140–440)
POTASSIUM SERPL-SCNC: 5.4 MMOL/L (ref 3.5–5.5)
SODIUM SERPL-SCNC: 134 MMOL/L (ref 135–145)
WBC # BLD AUTO: 5.24 X 10*3/UL (ref 4.5–10)

## 2024-06-12 RX ADMIN — SPIRONOLACTONE SCH MG: 25 TABLET, FILM COATED ORAL at 10:19

## 2024-06-12 RX ADMIN — Medication SCH MCG: at 09:16

## 2024-06-12 RX ADMIN — ASPIRIN 81 MG CHEWABLE TABLET SCH MG: 81 TABLET CHEWABLE at 10:19

## 2024-06-12 RX ADMIN — ISOSORBIDE MONONITRATE SCH: 30 TABLET, EXTENDED RELEASE ORAL at 09:14

## 2024-06-12 RX ADMIN — METOPROLOL SUCCINATE SCH: 25 TABLET, EXTENDED RELEASE ORAL at 09:12

## 2024-06-12 NOTE — US
EXAMINATION TYPE: US kidneys/renal and bladder

 

DATE OF EXAM: 6/12/2024

 

COMPARISON: CT

 

CLINICAL INDICATION: Male, 74 years old with history of assess for obstruction; Abnormal labs, right 
kidney surgically absent 10+ years

 

EXAM MEASUREMENTS:

 

Right Kidney:  Surgically absent 

Left Kidney: 12.7 X 5.9 X 5.4 cm

 

 

 

 

Right Kidney: Surgically absent  

Left Kidney: Appeared wnl  

Bladder: wnl

**Bilateral Jets seen: No

 

 

There is no evidence for hydronephrosis at this point in time.  No nephrolithiasis is seen.  No diane
s are identified.  The urinary bladder is anechoic.  

 

 

 

IMPRESSION:

Surgical absence of the right kidney. Otherwise unremarkable study.

## 2024-06-12 NOTE — P.CRDCN
History of Present Illness


Consult date: 06/12/24


History of present illness: 





This is a 74-year-old male patient of Dr. MILA Golden with past medical history of 

ischemic cardiomyopathy with severe LV systolic dysfunction, apical thrombus, 

chronic systolic heart failure, hypertension, dyslipidemia, single kidney.  

Patient has LifeVest in place from previous hospitalization in April of this 

year.  We have been asked to evaluate patient for ischemic cardiomyopathy.  

Patient was at Dr. MILA Golden's office yesterday for a follow-up appointment and 

had previously had blood work done on June 6 that revealed sodium 132, potassium

6.4, BUN 87 and creatinine 2.  Because of the abnormal kidney function and 

hyperkalemia, patient was sent over to [facility] emergency 

[place] for further evaluation.  Patient denies having any chest pain, no 

shortness of breath, no weakness, no lightheadedness or dizziness.  Patient 

states he has had loose stools since he left the hospital in April but not true 

diarrhea.  He denies having any chest pain with exertion.





EKG: Sinus bradycardia 54 bpm, no acute ST-T wave changes.


Chest x-ray: No acute process


Laboratory studies: Hemoglobin 11.5.  Sodium 134, potassium 5.4, BUN 71 

creatinine 1.8.


Home cardiac medications: Eliquis 5 mg twice daily, aspirin 81 mg daily, atorvas

tatin 80 mg at bedtime, Farxiga 10 mg daily, Lasix 40 mg twice daily, Imdur 30 

mg daily, lisinopril 20 mg daily, Toprol-XL 25 mg daily, spironolactone 25 mg 

daily.


Cardiac catheterization performed on 4/27/2024 in the setting of a acute non-ST 

elevated MI revealed severe two-vessel coronary artery disease.  Patient was 

evaluated by cardiothoracic surgery and plan was for medical management.


Echocardiogram performed 4/26/2024 revealed severe LV dysfunction with EF of 25 

to 30%, LV thrombus fairly large.


Lexiscan Cardiolite stress test performed in the office on 5/16/2024 reveals 

inconclusive EKG part of the stress test due to baseline EKG abnormalities.  

Abnormal nuclear scan showing ischemic cardiomyopathy with severe LV dysfunction

without any ischemia.





Review Of Systems:


At the time of my exam:


CONSTITUTIONAL: Denies fever or chills.


HEENT: Denies blurred vision, vision changes, or eye pain. Denies hemoptysis 


CARDIOVASCULAR: Denies chest pain. Denies orthopnea. Denies PND. Denies p

alpitations


RESPIRATORY: Denies shortness of breath. 


GASTROINTESTINAL: Denies abdominal pain. Denies nausea or vomiting. 


HEMATOLOGIC: Denies bleeding disorders.


GENITOURINARY:  Denies any blood in urine.


SKIN: Denies puritis. Denies rash.





Physical examination:


Gen: This is a 74-year-old male in no acute distress.


VS: reviewed, blood pressure 116/57 previously down to 89/53, heart rate is in 

the 50s, pulse ox 100% on room air.


HEENT: Head is atraumatic, normocephalic. Pupils equal, round. Sclerae is 

anicteric. 


NECK: Supple. No JVD. 


LUNGS: Clear to auscultation. No wheezes or rhonchi.  No intercostal 

retractions.


HEART: Regular rate and rhythm. No murmur. 


ABDOMEN: Soft  No tenderness.


EXTREMITIES: No pedal edema.  No calf tenderness.


NEUROLOGICAL: Patient is awake, alert and oriented x3.


 


Assessment:


Acute kidney injury


Possible lab error versus hyperkalemia


History of ischemic cardiomyopathy with known EF of 25 to 30% on LifeVest


Apical thrombus on Eliquis


Chronic systolic heart failure


Hypertension


Dyslipidemia


Single kidney





Plan:


Resume patient's home cardiac medications with the following changes:


Hold Farxiga, hold Lasix


Discontinue Imdur


Resume lisinopril at lower dose of 5 mg daily


Resume spironolactone at lower dose of 12.5 mg daily


Obtain limited 2-D echocardiogram and Doppler study to assess LV function


Further recommendations to follow based upon clinical course


Thank you kindly for this consultation.





Nurse practitioner note has been reviewed, I agree with documented findings and 

plan of care.  Patient was seen and examined.





Past Medical History


Past Medical History: Asthma, Cancer, Heart Failure, Hyperlipidemia, H

ypertension, Myocardial Infarction (MI), Osteoarthritis (OA)


Additional Past Medical History / Comment(s): Kidney CA


Last Myocardial Infarction Date:: 4/2024


History of Any Multi-Drug Resistant Organisms: None Reported


Additional Past Surgical History / Comment(s): Right nephrectomy, cataract 

removed R eye


Past Anesthesia/Blood Transfusion Reactions: No Reported Reaction


Past Psychological History: No Psychological Hx Reported


Smoking Status: Former smoker


Past Alcohol Use History: Rare


Past Drug Use History: None Reported





- Past Family History


  ** Mother


Family Medical History: Cancer


Additional Family Medical History / Comment(s): Anemia





  ** Father


Family Medical History: Myocardial Infarction (MI)





Medications and Allergies


                                Home Medications











 Medication  Instructions  Recorded  Confirmed  Type


 


Cholecalciferol [Vitamin D3 (25 25 mcg PO DAILY 04/25/24 06/11/24 History





Mcg = 1000 Iu)]    


 


lisinopriL [Zestril] 20 mg PO DAILY 04/25/24 06/11/24 History


 


Apixaban [Eliquis] 5 mg PO BID 30 Days #60 tab 04/29/24 06/11/24 Rx


 


Aspirin 81 mg PO DAILY 30 Days #30 tab 04/29/24 06/11/24 Rx


 


Atorvastatin [Lipitor] 80 mg PO HS 30 Days #30 tab 04/29/24 06/11/24 Rx


 


Dapagliflozin Propanediol [Farxiga] 10 mg PO DAILY 30 Days #30 tab 04/29/24 06/11/24 Rx


 


Furosemide [Lasix] 40 mg PO BID@0900,1600 30 Days #60 04/29/24 06/11/24 Rx





 tab   


 


Isosorbide Mononitrate ER [Imdur] 30 mg PO DAILY 30 Days #30 tab 04/29/24 06 /11/24 Rx


 


Metoprolol Succinate (ER) [Toprol 25 mg PO DAILY 30 Days #30 tab 04/29/24 06/11/24 Rx





XL]    


 


Spironolactone [Aldactone] 25 mg PO DAILY 30 Days #30 tab 04/29/24 06/11/24 Rx








                                    Allergies











Allergy/AdvReac Type Severity Reaction Status Date / Time


 


No Known Allergies Allergy   Verified 06/11/24 11:23














Physical Exam


Vitals: 


                                   Vital Signs











  Temp Pulse Pulse Resp BP BP Pulse Ox


 


 06/12/24 06:35  97.5 F L   43 L  17   106/67  100


 


 06/12/24 01:46  98.1 F   53 L  16   89/53  98


 


 06/11/24 20:45  98 F   50 L  16   114/62  100


 


 06/11/24 20:30    50 L    


 


 06/11/24 19:45   47 L   17  98/60   100


 


 06/11/24 18:45  97.6 F  50 L   24  91/57   100


 


 06/11/24 17:06  97.6 F  49 L   18  104/64   100


 


 06/11/24 13:39  98.3 F  50 L   20  95/59   100


 


 06/11/24 12:02  97.9 F  51 L   17  99/68   99


 


 06/11/24 10:12  98.1 F  57 L    110/68   98


 


 06/11/24 09:39  97.3 F L  61   16  99/63   100








                                Intake and Output











 06/11/24 06/12/24 06/12/24





 22:59 06:59 14:59


 


Intake Total 120  


 


Balance 120  


 


Intake:   


 


  Oral 120  


 


Other:   


 


  # Voids  3 


 


  Weight 80.286 kg  














Results





                                 06/12/24 04:48





                                 06/12/24 04:48


                                 Cardiac Enzymes











  06/11/24 Range/Units





  10:24 


 


AST  25  (17-59)  U/L








                                       CBC











  06/11/24 06/12/24 Range/Units





  10:24 04:48 


 


WBC  4.2  5.24  (3.8-10.6)  k/uL


 


RBC  4.22 L  3.87 L  (4.30-5.90)  m/uL


 


Hgb  12.5 L  11.5 L  (13.0-17.5)  gm/dL


 


Hct  37.1 L  35.2 L  (39.0-53.0)  %


 


Plt Count  130 L  115 L  (150-450)  k/uL








                          Comprehensive Metabolic Panel











  06/11/24 06/12/24 Range/Units





  10:24 04:48 


 


Sodium  135 L  134 L  (137-145)  mmol/L


 


Potassium  4.8  5.4  (3.5-5.1)  mmol/L


 


Chloride  107  107  ()  mmol/L


 


Carbon Dioxide  21 L  17.2 L  (22-30)  mmol/L


 


BUN  76 H  71.6 H  (9-20)  mg/dL


 


Creatinine  1.76 H  1.8 H  (0.66-1.25)  mg/dL


 


Glucose  93  89  (74-99)  mg/dL


 


Calcium  10.1  9.4  (8.4-10.2)  mg/dL


 


AST  25   (17-59)  U/L


 


ALT  22   (4-49)  U/L


 


Alkaline Phosphatase  46   ()  U/L


 


Total Protein  6.5   (6.3-8.2)  g/dL


 


Albumin  4.1   (3.5-5.0)  g/dL








                               Current Medications











Generic Name Dose Route Start Last Admin





  Trade Name Freq  PRN Reason Stop Dose Admin


 


Apixaban  5 mg  06/11/24 21:00  06/12/24 09:16





  Apixaban 5 Mg Tab  PO   5 mg





  BID CYNTHIA   Administration





  Protocol  


 


Atorvastatin Calcium  80 mg  06/11/24 21:00  06/11/24 20:33





  Atorvastatin 80 Mg Tab  PO   80 mg





  HS CYNTHIA   Administration


 


Cholecalciferol  25 mcg  06/12/24 09:00  06/12/24 09:16





  Cholecalciferol 25 Mcg (1000 Iu) Tablet  PO   25 mcg





  DAILY CYNTHIA   Administration


 


Isosorbide Mononitrate  30 mg  06/12/24 09:00  06/12/24 09:14





  Isosorbide Mononitrate Er 30 Mg Tab.Er.24h  PO   Not Given





  DAILY CYNTHIA  


 


Metoprolol Succinate  25 mg  06/12/24 09:00  06/12/24 09:12





  Metoprolol Succinate (Er) 25 Mg Tab.Er.24h  PO   Not Given





  DAILY CYNTHIA  


 


Naloxone HCl  0.2 mg  06/11/24 11:34 





  Naloxone 0.4 Mg/Ml 1 Ml Vial  IV  





  Q2M PRN  





  Opioid Reversal  








                                Intake and Output











 06/11/24 06/12/24 06/12/24





 22:59 06:59 14:59


 


Intake Total 120  


 


Balance 120  


 


Intake:   


 


  Oral 120  


 


Other:   


 


  # Voids  3 


 


  Weight 80.286 kg  








                                        





                                 06/12/24 04:48 





                                 06/12/24 04:48

## 2024-06-12 NOTE — CA
Transthoracic Echo Report 

 Name: Milton España 

 MRN:    [id number] 

 Age:    74     Gender:     M 

 

 :    1950 

 Exam Date:     2024 10:19 

 Exam Location: [facility] 

 Ht (in):     68     Wt (lb):     177 

 Ordering Physician:        Sheila Leggett 

 Attending/Referring Phys:         [id number], Betsey 

 Technician         Evelyn Coelho RDCS 

 Procedure CPT: 

 Indications:       LVF 

 

 Cardiac Hx: 

 Technical Quality:      Fair 

 Contrast 1:    Definity                    Total Dose (mL):      2 

 Contrast 2:                                Total Dose (mL): 

 

 MEASUREMENTS  (Male / Female) Normal Values 

 2D ECHO 

 LV Diastolic Volume MOD BP        195.6 cm???             67 - 155 / 56 - 104 cm??? 

 LV Systolic Volume MOD BP         99.3 cm???              22 - 58 / 19 - 49 cm??? 

 LV Ejection Fraction MOD BP       49.2 %                >= 55  % 

 LV Cardiac Index MOD BP           2628.5 cm???/min???m???      

 LV Diastolic Volume MOD 4C        213.0 cm???              

 LV Systolic Volume MOD 4C         110.2 cm???              

 LV Ejection Fraction MOD 4C       48.3 %                 

 LV Cardiac Index MOD 4C           2807.4 cm???/min???m???      

 LV Diastolic Length 4C            10.8 cm                

 LV Systolic Length 4C             9.5 cm                 

 LV Diastolic Volume MOD 2C        153.3 cm???              

 LV Systolic Volume MOD 2C         80.2 cm???               

 LV Ejection Fraction MOD 2C       47.7 %                 

 LV Cardiac Index MOD 2C           1996.0 cm???/min???m???      

 LV Diastolic Length 2C            9.2 cm                 

 LV Systolic Length 2C             8.5 cm                 

 

 DOPPLER 

 TR Peak Velocity                  249.9 cm/s             

 TR Peak Gradient                  25.0 mmHg              

 Right Atrial Pressure             10.0 mmHg              

 Pulmonary Artery Systolic Pressu  35.0 mmHg              

 Right Ventricular Systolic Press  35.0 mmHg              

 

 

 FINDINGS 

 Left Ventricle 

 Left ventricular ejection fraction is estimated at 45-50 %. Moderately  

 increased left ventricular diastolic volume. Severely increased left  

 ventricular systolic volume. Mildly decreased left ventricular ejection  

 fraction. 

 

 Right Ventricle 

 Moderate right ventricular dilatation with normal function. Borderline elevated  

 right ventricular systolic pressure 35mmHg. 

 

 Right Atrium 

 Right atrial dilatation. 

 

 Left Atrium 

 Left atrial dilatation. 

 

 Mitral Valve 

 Structurally normal mitral valve. No evidence for mitral valve prolapse. No  

 mitral stenosis. Trace mitral regurgitation. 

 

 Aortic Valve 

 Trileaflet aortic valve. No aortic valve stenosis or regurgitation. 

 

 Tricuspid Valve 

 Structurally normal tricuspid valve. No tricuspid stenosis. Mild tricuspid  

 regurgitation. 

 

 Pulmonic Valve 

 Pulmonic valve not well visualized. No pulmonic stenosis. No pulmonic  

 regurgitation. 

 

 Pericardium 

 No pericardial effusion. Prominent epicardial fat. 

 

 Aorta 

 Aortic root and proximal ascending aorta not assessed 

 

 CONCLUSIONS 

 Mild LV dilation with reduced LV systolic function ejection fraction 45% 

 Mild RV enlargement 

 Previewed by:  

 Dr. Ortega Atkins MD 

 (Electronically Signed) 

 Final Date:      2024 12:58

## 2024-06-17 ENCOUNTER — HOSPITAL ENCOUNTER (OUTPATIENT)
Dept: HOSPITAL 47 - LABWHC1 | Age: 74
Discharge: HOME | End: 2024-06-17
Attending: NURSE PRACTITIONER
Payer: MEDICARE

## 2024-06-17 DIAGNOSIS — I50.9: Primary | ICD-10-CM

## 2024-06-17 DIAGNOSIS — Q60.0: ICD-10-CM

## 2024-06-17 DIAGNOSIS — N17.9: ICD-10-CM

## 2024-06-17 LAB
ANION GAP SERPL CALC-SCNC: 9 MMOL/L (ref 4–12)
BUN SERPL-SCNC: 36.2 MG/DL (ref 9–27)
BUN/CREAT SERPL: 25.86 RATIO (ref 12–20)
CALCIUM SPEC-MCNC: 10.2 MG/DL (ref 8.7–10.3)
CHLORIDE SERPL-SCNC: 110 MMOL/L (ref 96–109)
CO2 SERPL-SCNC: 19 MMOL/L (ref 21.6–31.8)
GLUCOSE SERPL-MCNC: 101 MG/DL (ref 70–110)
POTASSIUM SERPL-SCNC: 5.5 MMOL/L (ref 3.5–5.5)
SODIUM SERPL-SCNC: 138 MMOL/L (ref 135–145)

## 2024-06-17 PROCEDURE — 36415 COLL VENOUS BLD VENIPUNCTURE: CPT

## 2024-06-17 PROCEDURE — 80048 BASIC METABOLIC PNL TOTAL CA: CPT

## 2024-08-17 ENCOUNTER — HOSPITAL ENCOUNTER (INPATIENT)
Dept: HOSPITAL 47 - DISRECOVER | Age: 74
LOS: 7 days | Discharge: HOME | DRG: 275 | End: 2024-08-24
Attending: INTERNAL MEDICINE | Admitting: INTERNAL MEDICINE
Payer: MEDICARE

## 2024-08-17 DIAGNOSIS — I49.3: ICD-10-CM

## 2024-08-17 DIAGNOSIS — N18.9: ICD-10-CM

## 2024-08-17 DIAGNOSIS — I25.10: ICD-10-CM

## 2024-08-17 DIAGNOSIS — I46.2: ICD-10-CM

## 2024-08-17 DIAGNOSIS — I25.5: ICD-10-CM

## 2024-08-17 DIAGNOSIS — R00.1: ICD-10-CM

## 2024-08-17 DIAGNOSIS — Z79.899: ICD-10-CM

## 2024-08-17 DIAGNOSIS — I49.01: Primary | ICD-10-CM

## 2024-08-17 DIAGNOSIS — Z79.82: ICD-10-CM

## 2024-08-17 DIAGNOSIS — I21.A1: ICD-10-CM

## 2024-08-17 DIAGNOSIS — J96.01: ICD-10-CM

## 2024-08-17 DIAGNOSIS — Z79.01: ICD-10-CM

## 2024-08-17 DIAGNOSIS — N17.9: ICD-10-CM

## 2024-08-17 DIAGNOSIS — Z90.5: ICD-10-CM

## 2024-08-17 PROCEDURE — 72125 CT NECK SPINE W/O DYE: CPT

## 2024-08-17 PROCEDURE — 99285 EMERGENCY DEPT VISIT HI MDM: CPT

## 2024-08-17 PROCEDURE — 4A023N7 MEASUREMENT OF CARDIAC SAMPLING AND PRESSURE, LEFT HEART, PERCUTANEOUS APPROACH: ICD-10-PCS

## 2024-08-17 PROCEDURE — 71045 X-RAY EXAM CHEST 1 VIEW: CPT

## 2024-08-17 PROCEDURE — 0BH17EZ INSERTION OF ENDOTRACHEAL AIRWAY INTO TRACHEA, VIA NATURAL OR ARTIFICIAL OPENING: ICD-10-PCS

## 2024-08-17 PROCEDURE — 94003 VENT MGMT INPAT SUBQ DAY: CPT

## 2024-08-17 PROCEDURE — 93306 TTE W/DOPPLER COMPLETE: CPT

## 2024-08-17 PROCEDURE — 0JH608Z INSERTION OF DEFIBRILLATOR GENERATOR INTO CHEST SUBCUTANEOUS TISSUE AND FASCIA, OPEN APPROACH: ICD-10-PCS

## 2024-08-17 PROCEDURE — 70450 CT HEAD/BRAIN W/O DYE: CPT

## 2024-08-17 PROCEDURE — 71260 CT THORAX DX C+: CPT

## 2024-08-17 PROCEDURE — B2111ZZ FLUOROSCOPY OF MULTIPLE CORONARY ARTERIES USING LOW OSMOLAR CONTRAST: ICD-10-PCS

## 2024-08-17 PROCEDURE — 36600 WITHDRAWAL OF ARTERIAL BLOOD: CPT

## 2024-08-17 PROCEDURE — 74177 CT ABD & PELVIS W/CONTRAST: CPT

## 2024-08-17 PROCEDURE — 87205 SMEAR GRAM STAIN: CPT

## 2024-08-17 PROCEDURE — 02HK3KZ INSERTION OF DEFIBRILLATOR LEAD INTO RIGHT VENTRICLE, PERCUTANEOUS APPROACH: ICD-10-PCS

## 2024-08-17 PROCEDURE — 93458 L HRT ARTERY/VENTRICLE ANGIO: CPT

## 2024-08-17 PROCEDURE — 33249 INSJ/RPLCMT DEFIB W/LEAD(S): CPT

## 2024-08-17 PROCEDURE — 5A1945Z RESPIRATORY VENTILATION, 24-96 CONSECUTIVE HOURS: ICD-10-PCS

## 2024-08-17 PROCEDURE — 71046 X-RAY EXAM CHEST 2 VIEWS: CPT

## 2024-08-17 PROCEDURE — 96375 TX/PRO/DX INJ NEW DRUG ADDON: CPT

## 2024-08-17 PROCEDURE — 87070 CULTURE OTHR SPECIMN AEROBIC: CPT

## 2024-08-17 PROCEDURE — 96374 THER/PROPH/DIAG INJ IV PUSH: CPT

## 2024-08-17 PROCEDURE — 93005 ELECTROCARDIOGRAM TRACING: CPT

## 2024-08-17 PROCEDURE — 96376 TX/PRO/DX INJ SAME DRUG ADON: CPT

## 2024-08-17 PROCEDURE — 96361 HYDRATE IV INFUSION ADD-ON: CPT

## 2024-08-17 PROCEDURE — 94002 VENT MGMT INPAT INIT DAY: CPT

## 2024-08-17 PROCEDURE — 02H63KZ INSERTION OF DEFIBRILLATOR LEAD INTO RIGHT ATRIUM, PERCUTANEOUS APPROACH: ICD-10-PCS

## 2024-08-17 RX ADMIN — IOPAMIDOL ONE ML: 755 INJECTION, SOLUTION INTRAVENOUS at 17:37

## 2024-08-22 RX ADMIN — IOPAMIDOL ONE ML: 755 INJECTION, SOLUTION INTRAVENOUS at 09:02

## 2024-09-12 NOTE — XR
Patient:   Milton España   Ordering Physician:   Unknown, Unknown   

  ID:   [id number]   Phone, Pager:   Phone: N/A Pager: N/A   

  :   1950       

  Age/Gender:   74Y, M   Primary Location:   N/A   

  Procedure:   cxr 1v   Study Date:   2024 5:23:36 AM   

  Accession #:   [id number]      

 

 

 

EXAMINATION TYPE: XR chest 1V

 

DATE OF EXAM: 2024

 

COMPARISON: 2024

 

HISTORY: 74-year-old male ICU follow-up

 

TECHNIQUE: Single frontal view of the chest is obtained.

 

FINDINGS:  

Heart mildly enlarged. Old healed fracture deformities left-sided ribs. Adjacent patchy opacity along
 the periphery of the left mid and lower lung probably pleural parenchymal scarring, unchanged. Right
 lung and pleural space are clear.

 

IMPRESSION:  Probable pleural parenchymal scarring along the periphery of the left mid and lower lung
, this opacity is unchanged and there are adjacent chronic healed rib fracture deformities.

## 2024-09-16 NOTE — XR
Patient:   Milton España   Ordering Physician:   Unknown, Unknown   

  ID:   [id number]   Phone, Pager:   Phone: N/A Pager: N/A   

  :   1950       

  Age/Gender:   74Y, M   Primary Location:   N/A   

  Procedure:   XR CHEST 2VW   Study Date:   2024 6:50:00 AM   

  Accession #:   [id number]      

 

 

 

TYPE: XR chest 1V

 

DATE OF EXAM: 2024

 

COMPARISON: 2024 

 

HISTORY: 74-year-old male lead placement check

 

TECHNIQUE: Single frontal view of the chest is obtained.

 

FINDINGS:  Left anterior chest wall AICD generator with right atrial and right ventricular leads. No 
appreciable pneumothorax. Heart mildly enlarged. No pleural effusion. Patchy posterior basilar opacit
y on the lateral view. Chronic left-sided rib fracture deformities now partially obscured by the gene
rator device.

 

IMPRESSION:  

1. Left anterior chest wall AICD generator with right atrial and right ventricular leads.

2. Mild cardiomegaly.

3. The patient's left-sided rib fracture deformities are now partially obscured.

4. Patchy posterior basilar atelectasis versus infiltrate. Correlate with symptoms.

## 2024-09-17 NOTE — XR
Patient

Milton España

ID

[id number]

[id number]

Order #

Accession #

[id number]

 

EXAMINATION TYPE: XR chest 1V

 

DATE OF EXAM: 8/17/2024

 

COMPARISON: No comparison available on downtime PACS.

 

INDICATION: Intubation difficulty breathing

 

TECHNIQUE: Single frontal view of the chest is obtained.

 

FINDINGS:  

The heart size is large.  

The pulmonary vasculature is normal.  

The lungs are clear.  

Endotracheal tube tip is 3.3 cm above myrna. Nasogastric tube tip is within the proximal left upper 
quadrant of the abdomen. This could be advanced approximately 10 cm.

 

IMPRESSION:  

1. Very minimally.

2. Endotracheal tube tip above the myrna.

3. Nasogastric tube within the proximal left upper quadrant, this can be advanced approximately 10 cm
 for better positioning.

## 2024-09-17 NOTE — CA
Transthoracic Echo Report 

 Name: Milton España 

 MRN:    [id number] 

 Age:    74     Gender:     M 

 

 :    1950 

 Exam Date:     2024 12:50 

 Exam Location: [facility] 

 Ht (in):     70     Wt (lb):     180 

 Ordering Physician: 

 Attending/Referring Phys: 

 TechnEvelyn Haddad RDCS 

 Procedure CPT: 

 Indications: 

 

 Cardiac Hx: 

 Technical Quality:      Technically difficult study 

 Contrast 1:    Definity                    Total Dose (mL):      2 

 Contrast 2:                                Total Dose (mL): 

 

 MEASUREMENTS  (Male / Female) Normal Values 

 2D ECHO 

 LV Diastolic Diameter PLAX        5.8 cm                4.2 - 5.9 / 3.9 - 5.3 cm 

 LV Systolic Diameter PLAX         4.4 cm                 

 IVS Diastolic Thickness           1.0 cm                0.6 - 1.0 / 0.6 - 0.9 cm 

 LVPW Diastolic Thickness          1.3 cm                0.6 - 1.0 / 0.6 - 0.9 cm 

 LV Relative Wall Thickness        0.4                    

 LVOT Diameter                     2.2 cm                 

 LV Diastolic Volume MOD BP        177.6 cm???             67 - 155 / 56 - 104 cm??? 

 LV Systolic Volume MOD BP         98.2 cm???              22 - 58 / 19 - 49 cm??? 

 LV Ejection Fraction MOD BP       44.7 %                >= 55  % 

 LV Cardiac Index MOD BP           2399.1 cm???/min???m???      

 LV Diastolic Volume MOD 4C        177.3 cm???              

 LV Systolic Volume MOD 4C         101.7 cm???              

 LV Ejection Fraction MOD 4C       42.7 %                 

 LV Cardiac Index MOD 4C           2285.9 cm???/min???m???      

 LV Diastolic Length 4C            9.4 cm                 

 LV Systolic Length 4C             7.7 cm                 

 LV Diastolic Volume MOD 2C        172.5 cm???              

 LV Systolic Volume MOD 2C         85.5 cm???               

 LV Ejection Fraction MOD 2C       50.5 %                 

 LV Cardiac Index MOD 2C           2630.0 cm???/min???m???      

 LV Diastolic Length 2C            9.7 cm                 

 LV Systolic Length 2C             8.6 cm                 

 LA Volume                         66.8 cm???              18 - 58 / 22 - 52 cm??? 

 LA Volume Index                   33.1 cm???/m???           16 - 28 cm???/m??? 

 Ascending Aorta Diameter          3.7 cm                 

 

 DOPPLER 

 AV Peak Velocity                  104.0 cm/s             

 AV Peak Gradient                  4.3 mmHg               

 AV Mean Velocity                  71.9 cm/s              

 AV Mean Gradient                  2.3 mmHg               

 AV Velocity Time Integral         18.9 cm                

 LVOT Peak Velocity                108.6 cm/s             

 LVOT Peak Gradient                4.7 mmHg               

 LVOT Velocity Time Integral       17.3 cm                

 LVOT Stroke Volume                66.6 cm???               

 LVOT Stroke Volume Index          33.4 ml/m???             

 LVOT Cardiac Index                2011.5 cm???/min???m???      

 AV Area Cont Eq vti               3.5 cm???                

 AV Area Cont Eq pk                4.0 cm???                

 MV Area PHT                       4.2 cm???                

 Mitral E Point Velocity           41.9 cm/s              

 Mitral A Point Velocity           65.4 cm/s              

 Mitral E to A Ratio               0.6                    

 MV Deceleration Time              182.5 ms               

 TR Peak Velocity                  223.8 cm/s             

 TR Peak Gradient                  20.0 mmHg              

 Right Atrial Pressure             20.0 mmHg              

 Pulmonary Artery Systolic Pressu  40.0 mmHg              

 Right Ventricular Systolic Press  40.0 mmHg              

 PV Peak Velocity                  99.7 cm/s              

 PV Peak Gradient                  4.0 mmHg               

 

 

 FINDINGS 

 Left Ventricle 

 Left ventricular ejection fraction is estimated at 30-35 %.  Mildly increased  

 left ventricular diastolic volume. Severely increased left ventricular systolic  

 volume. Severely decreased left ventricular ejection fraction with regional  

 varibility. 

 

 Right Ventricle 

 Normal right ventricular size and function. Mildly elevated right ventricular  

 systolic pressure. 

 

 Right Atrium 

 Normal right atrial size. 

 

 Left Atrium 

 Mildly increased left atrial volume. Mildly increased left atrial area. 

 

 Mitral Valve 

 Structurally normal mitral valve. No evidence for mitral valve prolapse. No  

 mitral stenosis. Trace mitral regurgitation. 

 

 Aortic Valve 

 Trileaflet aortic valve. No aortic stenosis. Trace aortic regurgitation. 

 

 Tricuspid Valve 

 Structurally normal tricuspid valve. No tricuspid stenosis. Mild tricuspid  

 regurgitation. 

 

 Pulmonic Valve 

 Structurally normal pulmonic valve. No pulmonic stenosis. No pulmonic  

 regurgitation. 

 

 Pericardium 

 No pericardial effusion. 

 

 Aorta 

 Normal size aortic root and proximal ascending aorta. 

 

 CONCLUSIONS 

 Left ventricular ejection fraction 30-35% 

 RVSP 40 

 Mildly dilated left atrium 

 Trace mitral regurgitation 

 Mild tricuspid regurgitation 

 No pericardial effusion 

 Previewed by:  

 Dr. Fidencio Morse DO 

 (Electronically Signed) 

 Final Date:      2024 18:01

## 2024-09-17 NOTE — XR
EXAMINATION TYPE: XR chest 1V portable

 

DATE OF EXAM: 8/16/2024. Images presented 9/16/2024 for final interpretation, previous dictation david
ot be located.

 

COMPARISON: 8/23/2024

 

INDICATION: Cardiac arrest

 

TECHNIQUE: Single frontal view of the chest is obtained.

 

FINDINGS:  

The heart size is enlarged.  

The pulmonary vasculature is normal.  

Lung fields appear clear.  No pneumothorax present.

Endotracheal tube tip is 4.2 cm above the myrna. Nasogastric tube transverses the thorax tip in left
 upper quadrant of the abdomen. 

 

IMPRESSION:  

1. Cardiomegaly.

2. Lines and catheters discussed above

 

[facility], Workstation: [id number], 9/17/2024 9:03 AM

## 2024-09-17 NOTE — XR
EXAMINATION TYPE: XR chest 1V

 

DATE OF EXAM: 8/19/2024

 

COMPARISON: 6/11/2024

 

HISTORY: 74-year-old male intubated, ICU follow-up

 

TECHNIQUE: Single frontal view of the chest is obtained.

 

FINDINGS:  Patient is rotated toward the left altering the normal cardiac and mediastinal contours. E
T and NG tubes are satisfactory. Heart overall normal size. There is some focal density at the periph
shane of the left base. Old healed left-sided rib fracture 40s.

 

IMPRESSION:  Portable exam further limited by patient rotation. There is some patchy infiltrate and/o
r small effusion at the left base.

## 2024-09-17 NOTE — XR
Milton España 

ID: [id number] 

: 1950

 

 

 

EXAMINATION TYPE: XR chest 1V

 

DATE OF EXAM: 2024

 

COMPARISON: 2024

 

HISTORY: 74 year-old male shortness of breath, ICU follow-up

 

TECHNIQUE: Single frontal view of the chest is obtained.

 

FINDINGS:  Heart upper limits of normal in size. Old healed left-sided rib fracture deformities. Aretha
ent is obliqued and slightly rotated toward the left. Interval extubation. There is some patchy densi
ty along the periphery of the left mid and lower lung which may be slightly increased.

 

IMPRESSION:  Old healed left-sided rib fracture deformities. Mild patchy density along the periphery 
of the left mid and lower lung appears slightly increased.

## 2024-09-18 NOTE — CDI
Documentation Clarification Form



Date: 09/18/2024 11:37:42 AM

From: Elena Lin

MRN: [id number]

Admit Date: 08/17/2024 06:00:00 AM

Patient Name: Milton España

Visit Number: [id number]

Discharge Date:  08/24/2024 03:24:00 PM





ATTENTION: The Clinical Documentation Specialists (CDI) and [facility] Coding Staff 
appreciate your assistance in clarifying documentation. Please respond to the 
clarification below the line at the bottom and electronically sign. The CDI & 
[facility] Coding staff will review the response and follow-up if needed. Please note: 
Queries are made part of the Legal Health Record. If you have any questions, 
please contact the author of this message via ITS.



Doctor/Provider: Fidencio Morse





Conflicting documentation has been found in the medical record.  As attending 
physician, please provide clarification.



Per cardiac consult Type II MI

Per another cardiac consult NSTEMI



History/Risk Factors:  cardiac arrest, ventricular fibrillation, ischemic 
cardiomyopathy, chronic CHF



Clinical Indicators:  Troponins   2.31, 2.92, 4.03



Treatment:  heparin, AICD placement, LT heart cath



Please clarify which diagnosis is most appropriate:



[ X ]  NSTEMI, Type II

[  ]  Type II MI

[  ]  Other (please specify) __________

[  ]  Unable to determine 





___________________________________________________________________________



MTDD

## 2024-09-19 NOTE — CT
ADDENDUM - Added Sruthi Zurita M.D. on 8/17/2024 12:31 AM (-04:00)

Multiple old left rib fractures.



EXAM:

CT Chest With Intravenous Contrast



CLINICAL HISTORY:

Cardiac arrest, unknown trauma, possible choking



TECHNIQUE:

Axial computed tomography images of the chest with intravenous contrast. CTDI is
10.8 mGyand DLP

is 883.6 mGy-cm. This CT exam was performed using one or more of the following 
dose reduction

techniques: automated exposure control, adjustment of the mA and/or kV according
to patient size,

and/or use of iterative reconstruction technique.



COMPARISON:

No relevant prior studies available.



FINDINGS:

Lungs:Dependent airspace opacities in the right lower lobe.

Pleural space:No pneumothorax. Trace right pleural effusion.

Heart:Cardiomegaly and coronary artery atherosclerosis. No significant 
pericardial effusion.

Mediastinum:Unremarkable. No mediastinal hematoma.

Thyroid:Unremarkable. Normal thyroid.

Bones/joints:Unremarkable. No acute fracture or dislocation.

Soft tissues:Unremarkable.

Vasculature:No aortic aneurysm or dissection.

Lymph nodes:Unremarkable. No adenopathy.

Tubes, lines and devices: Endotracheal tube terminates 3.3 cm from the myrna. 
Enteric tube extends

to the stomach.



IMPRESSION:

1. Dependent airspace opacities in the right lower lobe. Appearance could 
represent aspiration or

atelectasis.

2. Trace right pleural effusion.

_______________________________________________

EXAM:

CT Abdomen and Pelvis With Intravenous Contrast



CLINICAL HISTORY:

Cardiac arrest, unknown trauma, possible choking



TECHNIQUE:

Axial computed tomography images of the abdomen and pelvis with intravenous 
contrast. CTDI is 11.3

mGy and DLP is 776.4 mGy-cm. This CT exam was performed using one or more of the
following dose

reduction techniques: automated exposure control, adjustment of the mA and/or kV
according to

patient size, and/or use of iterative reconstruction technique.

Delayed imaging was performed.



COMPARISON:

No relevant prior studies available.



FINDINGS:

Lung bases:Unremarkable. No mass. No consolidation.



ABDOMEN:

Liver:Unremarkable. No hepatic injury.

Gallbladder and bile ducts:No biliary dilatation. Unremarkable gallbladder. No 
calcified stones.

Pancreas:Unremarkable. No mass. No ductal dilation.

Spleen:Unremarkable. No splenic injury.

Adrenals:Unremarkable. Normal left adrenal gland. Right adrenal gland not 
visualized.

Kidneys and ureters:Right nephrectomy. Normal left kidney. No hydronephrosis.

Stomach and bowel:Gastric distention. No bowel obstruction, inflammation, or 
injury. Colonic

diverticulosis. No mucosal thickening.



PELVIS:

Appendix:Normal appendix.

Bladder: Keen catheter in the urinary bladder.

Reproductive:Unremarkable as visualized.



ABDOMEN and PELVIS:

Intraperitoneal space:Unremarkable. No free air, significant free fluid, or 
fluid collection.

Bones/joints:Degenerative changes in the lumbar spine. No acute fracture or 
dislocation.

Soft tissues:Unremarkable.

Vasculature:Atherosclerosis. No aortic aneurysm.

Lymph nodes:Unremarkable. No enlarged lymph nodes.

Tubes, lines and devices: Enteric tube extends the stomach.



IMPRESSION:

No acute traumatic findings within the abdomen or pelvis.



Radiologist: Maral Zurita M.D. Electronically Signed: 08/17/24 00:31 Phone: 
[phone]

Study ready at 23:37 and initial results transmitted at 23:48

[facility]

## 2024-09-19 NOTE — CT
EXAM:

CT Head Without Intravenous Contrast



CLINICAL HISTORY:

Cardiac arrest, unknown trauma, possible choking



TECHNIQUE:

Axial computed tomography images of the head/brain without intravenous contrast.
CTDI is 45.2 mGy

and DLP is 1113 mGy-cm. This CT exam was performed using one or more of the 
following dose

reduction techniques: automated exposure control, adjustment of the mA and/or kV
according to

patient size, and/or use of iterative reconstruction technique.



COMPARISON:

No relevant prior studies available.



FINDINGS:

Brain:Age-related parenchymal volume loss. Mild chronic small vessel ischemic 
change. Gray-white

matter differentiation maintained. No hemorrhage, mass effect, parenchymal 
edema, or midline shift.

Ventricles:Unremarkable. No hydrocephalus.

Bones/joints:Unremarkable. No acute fracture.

Soft tissues:Unremarkable.

Vasculature: Intracranial atherosclerosis.

Sinuses: Trace mucosal thickening in the sphenoid sinus. Mucous retention cyst 
left maxillary sinus.

Mastoid air cells:Unremarkable as visualized. No mastoid effusion.

Tubes, lines and devices:Nasogastric tube via the right naris. Partially imaged 
endotracheal tube.



IMPRESSION:

1. No acute intracranial process.

2. If there is concern for anoxic injury, recommend continued CT follow-up.

_______________________________________________

EXAM:

CT Cervical Spine Without Intravenous Contrast



CLINICAL HISTORY:

Cardiac arrest, unknown trauma, possible choking



TECHNIQUE:

Axial computed tomography images of the cervical spine without intravenous 
contrast. CTDI is 14.4

mGy and DLP is 444.7 mGy-cm. This CT exam was performed using one or more of the
following dose

reduction techniques: automated exposure control, adjustment of the mA and/or kV
according to

patient size, and/or use of iterative reconstruction technique.



COMPARISON:

No relevant prior studies available.



FINDINGS:

Vertebrae:Unremarkable. No acute fracture. No traumatic subluxation.

Discs/spinal canal/neural foramina: Lower cervical disc degeneration. Bilateral 
facet degeneration. No

significant central canal stenosis. Mild multilevel foraminal narrowing.

Soft tissues:Unremarkable.

Lung apices:Clear lung apices.

Tubes, lines and devices: Endotracheal tube and enteric tube in place.



IMPRESSION:

No acute findings in the cervical spine.



Radiologist: Maral Zurita M.D. Electronically Signed: 08/16/24 23:44 Phone: 
[phone]

Study ready at 23:37 and initial results transmitted at 23:44

[facility]

## 2024-09-26 NOTE — PN
PROGRESS NOTE



DATE OF SERVICE:  08/22/2024



SUBJECTIVE:

Mr. España underwent a dual-chamber ICD implantation for hospital cardiac arrest with

VF.  He has known ischemic cardiomyopathy, non-revascularizable, on maximally tolerated

medical treatment.  This is the ACC appropriate use criteria for ICD.  Indication #9A

on page 12 of the document.



Briefly, the patient was brought to the EP lab in a fasting state. Written informed

consent was obtained prior to the procedure.  IV antibiotics were administered

including IV vancomycin and Kefzol.  Under sterile precautions, the left pectoral

pocket was made, 2 leads were positioned where the axillary vein access in the right

atrial lead and the ICD lead.



Right atrial lead was screwed in the right atrial appendage with P waves of 4 mV,

pacing threshold 0.5 V at 0.4 milliseconds and pacing impedance of 475 ohms 10 V test

was negative.  RV lead position in the RV apex.  R-waves 30 mV, pacing threshold 0.5 V

at 0.4 milliseconds and pacing impedance of 418 ohms.  Leads were secured and Medtronic

ICD was implanted, leads were the Medtronic leads.  This was a [place] XT DR ICD MRI

SureScan, this was implanted in subfascial pocket.  Antibiotic pouch was placed and the

wound was closed in 3 layers and dressed per protocol.  The patient already received IV

vancomycin and Kefzol.



DFT testing was deferred.  The patient has severe sinus bradycardia at rest and

therefore a dual-chamber ICD was implanted.  This programming was DDDR 60 to 130 ppm.

VT zone 176 beats per minute, VF zone 207 beats per minute, appropriate antitachycardia

pacing cardioversion defibrillation programmed monitor zone of 150 beats per minute.

Sensitivity 0.5 mV.  The patient tolerated the procedure well without any acute

complications.



PLAN:

Maximize beta blockers, switch to carvedilol 6.25 mg twice daily, and increase to 12.5

mg twice daily.  Continue Entresto, spironolactone, aspirin, and consider Plavix in

place of aspirin in the future.  Continue high dose statins.





MMODL / IJN: [id number] / Job#: [id number]

## 2024-09-27 NOTE — XR
EXAM:

XR Chest, 1 View



CLINICAL HISTORY:

Cardiac arrest, unknown trauma, possible choking. ETT & OGT placement.



TECHNIQUE:

Frontal view of the chest.



COMPARISON:

No relevant prior studies available.



FINDINGS:

Lungs:Right lower lobe airspace opacities.

Pleural space:No pleural effusion or pneumothorax.

Heart:Cardiomegaly.

Bones/joints:Old left rib fractures.

Tubes, lines and devices: Endotracheal tube 3.8 cm from the myrna. Enteric tube
extends to the

stomach and beyond the field-of-view.



IMPRESSION:

1. Right lower lobe airspace opacities.

2. Endotracheal tube 3.8 cm from the myrna.

3. Enteric tube extends to the stomach and beyond the field-of-view.



Radiologist: Maral Zurita M.D. Electronically Signed: 08/17/24 00:32 Phone: 
[phone]

Study ready at 00:26 and initial results transmitted at 00:32

[facility]

## 2025-04-01 ENCOUNTER — HOSPITAL ENCOUNTER (OUTPATIENT)
Dept: HOSPITAL 47 - LABWHC1 | Age: 75
Discharge: HOME | End: 2025-04-01
Attending: INTERNAL MEDICINE
Payer: MEDICARE

## 2025-04-01 DIAGNOSIS — I50.22: Primary | ICD-10-CM

## 2025-04-01 DIAGNOSIS — E78.2: ICD-10-CM

## 2025-04-01 LAB
ALT SERPL-CCNC: 21 U/L (ref 10–49)
AST SERPL-CCNC: 26 U/L (ref 14–35)
BUN SERPL-SCNC: 39.2 MG/DL (ref 9–27)
CALCIUM SPEC-MCNC: 10.5 MG/DL (ref 8.7–10.3)
CHLORIDE SERPL-SCNC: 101 MMOL/L (ref 96–109)
ERYTHROCYTE [DISTWIDTH] IN BLOOD BY AUTOMATED COUNT: 4.96 X 10*6/UL (ref 4.4–5.6)
GLUCOSE SERPL-MCNC: 122 MG/DL (ref 70–110)
HGB BLD-MCNC: 15.2 G/DL (ref 13–17)
LDLC SERPL CALC-MCNC: 41.2 MG/DL (ref 0–131)
MCH RBC QN AUTO: 30.6 PG (ref 27–32)
MCHC RBC AUTO-ENTMCNC: 32.3 G/DL (ref 32–37)
MCV RBC AUTO: 94.8 FL (ref 80–97)
NRBC BLD AUTO-RTO: 0 X 10*3/UL (ref 0–0.01)
PLATELET # BLD AUTO: 202 X 10*3/UL (ref 140–440)
POTASSIUM SERPL-SCNC: 4.9 MMOL/L (ref 3.5–5.5)
SODIUM SERPL-SCNC: 137 MMOL/L (ref 135–145)
WBC # BLD AUTO: 6.88 X 10*3/UL (ref 4.5–10)

## 2025-04-01 PROCEDURE — 84443 ASSAY THYROID STIM HORMONE: CPT

## 2025-04-01 PROCEDURE — 84450 TRANSFERASE (AST) (SGOT): CPT

## 2025-04-01 PROCEDURE — 85027 COMPLETE CBC AUTOMATED: CPT

## 2025-04-01 PROCEDURE — 80048 BASIC METABOLIC PNL TOTAL CA: CPT

## 2025-04-01 PROCEDURE — 80061 LIPID PANEL: CPT

## 2025-04-01 PROCEDURE — 36415 COLL VENOUS BLD VENIPUNCTURE: CPT

## 2025-04-01 PROCEDURE — 84460 ALANINE AMINO (ALT) (SGPT): CPT
